# Patient Record
Sex: FEMALE | Race: OTHER | HISPANIC OR LATINO
[De-identification: names, ages, dates, MRNs, and addresses within clinical notes are randomized per-mention and may not be internally consistent; named-entity substitution may affect disease eponyms.]

---

## 2017-01-04 ENCOUNTER — APPOINTMENT (OUTPATIENT)
Dept: ORTHOPEDIC SURGERY | Facility: CLINIC | Age: 77
End: 2017-01-04

## 2017-01-04 VITALS
HEIGHT: 55 IN | SYSTOLIC BLOOD PRESSURE: 159 MMHG | HEART RATE: 69 BPM | BODY MASS INDEX: 32.63 KG/M2 | WEIGHT: 141 LBS | DIASTOLIC BLOOD PRESSURE: 76 MMHG

## 2017-02-06 ENCOUNTER — APPOINTMENT (OUTPATIENT)
Dept: ORTHOPEDIC SURGERY | Facility: CLINIC | Age: 77
End: 2017-02-06

## 2017-02-06 VITALS — SYSTOLIC BLOOD PRESSURE: 144 MMHG | HEART RATE: 82 BPM | DIASTOLIC BLOOD PRESSURE: 83 MMHG

## 2017-02-06 VITALS — BODY MASS INDEX: 27.68 KG/M2 | WEIGHT: 141 LBS | HEIGHT: 60 IN

## 2017-02-06 VITALS — BODY MASS INDEX: 27.48 KG/M2 | WEIGHT: 140 LBS | HEIGHT: 60 IN

## 2017-03-20 ENCOUNTER — APPOINTMENT (OUTPATIENT)
Dept: ORTHOPEDIC SURGERY | Facility: CLINIC | Age: 77
End: 2017-03-20

## 2018-02-27 ENCOUNTER — APPOINTMENT (OUTPATIENT)
Dept: THORACIC SURGERY | Facility: CLINIC | Age: 78
End: 2018-02-27
Payer: MEDICAID

## 2018-02-27 VITALS
HEART RATE: 63 BPM | DIASTOLIC BLOOD PRESSURE: 75 MMHG | BODY MASS INDEX: 28.47 KG/M2 | WEIGHT: 145 LBS | HEIGHT: 60 IN | RESPIRATION RATE: 16 BRPM | SYSTOLIC BLOOD PRESSURE: 167 MMHG | OXYGEN SATURATION: 99 %

## 2018-02-27 DIAGNOSIS — Z80.1 FAMILY HISTORY OF MALIGNANT NEOPLASM OF TRACHEA, BRONCHUS AND LUNG: ICD-10-CM

## 2018-02-27 PROCEDURE — 99203 OFFICE O/P NEW LOW 30 MIN: CPT

## 2018-02-27 RX ORDER — LOSARTAN POTASSIUM 100 MG/1
TABLET, FILM COATED ORAL
Refills: 0 | Status: ACTIVE | COMMUNITY

## 2018-03-01 ENCOUNTER — OUTPATIENT (OUTPATIENT)
Dept: OUTPATIENT SERVICES | Facility: HOSPITAL | Age: 78
LOS: 1 days | End: 2018-03-01
Payer: MEDICAID

## 2018-03-01 DIAGNOSIS — Z98.51 TUBAL LIGATION STATUS: Chronic | ICD-10-CM

## 2018-03-01 PROCEDURE — G9001: CPT

## 2018-03-06 ENCOUNTER — APPOINTMENT (OUTPATIENT)
Dept: ORTHOPEDIC SURGERY | Facility: CLINIC | Age: 78
End: 2018-03-06
Payer: MEDICAID

## 2018-03-06 VITALS — DIASTOLIC BLOOD PRESSURE: 75 MMHG | HEART RATE: 61 BPM | SYSTOLIC BLOOD PRESSURE: 155 MMHG

## 2018-03-06 DIAGNOSIS — G89.29 PAIN IN RIGHT KNEE: ICD-10-CM

## 2018-03-06 DIAGNOSIS — M25.561 PAIN IN RIGHT KNEE: ICD-10-CM

## 2018-03-06 PROCEDURE — 73564 X-RAY EXAM KNEE 4 OR MORE: CPT | Mod: RT

## 2018-03-06 PROCEDURE — 99213 OFFICE O/P EST LOW 20 MIN: CPT

## 2018-03-07 PROBLEM — M25.561 CHRONIC PAIN OF RIGHT KNEE: Status: ACTIVE | Noted: 2018-03-07

## 2018-03-09 ENCOUNTER — APPOINTMENT (OUTPATIENT)
Dept: ORTHOPEDIC SURGERY | Facility: CLINIC | Age: 78
End: 2018-03-09
Payer: MEDICAID

## 2018-03-09 VITALS
DIASTOLIC BLOOD PRESSURE: 84 MMHG | HEIGHT: 60 IN | HEART RATE: 74 BPM | WEIGHT: 145 LBS | SYSTOLIC BLOOD PRESSURE: 169 MMHG | BODY MASS INDEX: 28.47 KG/M2

## 2018-03-09 PROCEDURE — 99215 OFFICE O/P EST HI 40 MIN: CPT

## 2018-03-12 ENCOUNTER — APPOINTMENT (OUTPATIENT)
Dept: ORTHOPEDIC SURGERY | Facility: CLINIC | Age: 78
End: 2018-03-12

## 2018-03-13 ENCOUNTER — APPOINTMENT (OUTPATIENT)
Dept: THORACIC SURGERY | Facility: CLINIC | Age: 78
End: 2018-03-13
Payer: MEDICAID

## 2018-03-13 VITALS
SYSTOLIC BLOOD PRESSURE: 164 MMHG | DIASTOLIC BLOOD PRESSURE: 82 MMHG | HEART RATE: 96 BPM | RESPIRATION RATE: 14 BRPM | OXYGEN SATURATION: 100 %

## 2018-03-13 PROCEDURE — 99213 OFFICE O/P EST LOW 20 MIN: CPT

## 2018-03-14 ENCOUNTER — INPATIENT (INPATIENT)
Facility: HOSPITAL | Age: 78
LOS: 3 days | Discharge: HOME | End: 2018-03-18
Attending: INTERNAL MEDICINE | Admitting: INTERNAL MEDICINE
Payer: MEDICAID

## 2018-03-14 VITALS
HEART RATE: 49 BPM | TEMPERATURE: 98 F | SYSTOLIC BLOOD PRESSURE: 160 MMHG | OXYGEN SATURATION: 100 % | DIASTOLIC BLOOD PRESSURE: 66 MMHG | RESPIRATION RATE: 16 BRPM

## 2018-03-14 DIAGNOSIS — Z98.51 TUBAL LIGATION STATUS: Chronic | ICD-10-CM

## 2018-03-14 LAB
ALBUMIN SERPL ELPH-MCNC: 4.1 G/DL — SIGNIFICANT CHANGE UP (ref 3.3–5)
ALP SERPL-CCNC: 65 U/L — SIGNIFICANT CHANGE UP (ref 40–120)
ALT FLD-CCNC: 23 U/L — SIGNIFICANT CHANGE UP (ref 4–33)
AST SERPL-CCNC: 44 U/L — HIGH (ref 4–32)
BASE EXCESS BLDV CALC-SCNC: 4 MMOL/L — SIGNIFICANT CHANGE UP
BASOPHILS # BLD AUTO: 0.02 K/UL — SIGNIFICANT CHANGE UP (ref 0–0.2)
BASOPHILS NFR BLD AUTO: 0.4 % — SIGNIFICANT CHANGE UP (ref 0–2)
BILIRUB SERPL-MCNC: 0.4 MG/DL — SIGNIFICANT CHANGE UP (ref 0.2–1.2)
BLOOD GAS VENOUS - CREATININE: 0.87 MG/DL — SIGNIFICANT CHANGE UP (ref 0.5–1.3)
BUN SERPL-MCNC: 20 MG/DL — SIGNIFICANT CHANGE UP (ref 7–23)
CALCIUM SERPL-MCNC: 9.3 MG/DL — SIGNIFICANT CHANGE UP (ref 8.4–10.5)
CHLORIDE BLDV-SCNC: 101 MMOL/L — SIGNIFICANT CHANGE UP (ref 96–108)
CHLORIDE SERPL-SCNC: 100 MMOL/L — SIGNIFICANT CHANGE UP (ref 98–107)
CK MB BLD-MCNC: 1.77 NG/ML — SIGNIFICANT CHANGE UP (ref 1–4.7)
CK SERPL-CCNC: 146 U/L — SIGNIFICANT CHANGE UP (ref 25–170)
CO2 SERPL-SCNC: 27 MMOL/L — SIGNIFICANT CHANGE UP (ref 22–31)
CREAT SERPL-MCNC: 1.05 MG/DL — SIGNIFICANT CHANGE UP (ref 0.5–1.3)
EOSINOPHIL # BLD AUTO: 0.04 K/UL — SIGNIFICANT CHANGE UP (ref 0–0.5)
EOSINOPHIL NFR BLD AUTO: 0.8 % — SIGNIFICANT CHANGE UP (ref 0–6)
GAS PNL BLDV: 136 MMOL/L — SIGNIFICANT CHANGE UP (ref 136–146)
GLUCOSE BLDV-MCNC: 122 — HIGH (ref 70–99)
GLUCOSE SERPL-MCNC: 143 MG/DL — HIGH (ref 70–99)
HCO3 BLDV-SCNC: 26 MMOL/L — SIGNIFICANT CHANGE UP (ref 20–27)
HCT VFR BLD CALC: 40.8 % — SIGNIFICANT CHANGE UP (ref 34.5–45)
HCT VFR BLDV CALC: 38.5 % — SIGNIFICANT CHANGE UP (ref 34.5–45)
HGB BLD-MCNC: 12.9 G/DL — SIGNIFICANT CHANGE UP (ref 11.5–15.5)
HGB BLDV-MCNC: 12.5 G/DL — SIGNIFICANT CHANGE UP (ref 11.5–15.5)
IMM GRANULOCYTES # BLD AUTO: 0.02 # — SIGNIFICANT CHANGE UP
IMM GRANULOCYTES NFR BLD AUTO: 0.4 % — SIGNIFICANT CHANGE UP (ref 0–1.5)
LACTATE BLDV-MCNC: 2.9 MMOL/L — HIGH (ref 0.5–2)
LYMPHOCYTES # BLD AUTO: 1.41 K/UL — SIGNIFICANT CHANGE UP (ref 1–3.3)
LYMPHOCYTES # BLD AUTO: 27.5 % — SIGNIFICANT CHANGE UP (ref 13–44)
MCHC RBC-ENTMCNC: 29.3 PG — SIGNIFICANT CHANGE UP (ref 27–34)
MCHC RBC-ENTMCNC: 31.6 % — LOW (ref 32–36)
MCV RBC AUTO: 92.5 FL — SIGNIFICANT CHANGE UP (ref 80–100)
MONOCYTES # BLD AUTO: 0.32 K/UL — SIGNIFICANT CHANGE UP (ref 0–0.9)
MONOCYTES NFR BLD AUTO: 6.3 % — SIGNIFICANT CHANGE UP (ref 2–14)
NEUTROPHILS # BLD AUTO: 3.31 K/UL — SIGNIFICANT CHANGE UP (ref 1.8–7.4)
NEUTROPHILS NFR BLD AUTO: 64.6 % — SIGNIFICANT CHANGE UP (ref 43–77)
NRBC # FLD: 0 — SIGNIFICANT CHANGE UP
PCO2 BLDV: 62 MMHG — HIGH (ref 41–51)
PH BLDV: 7.31 PH — LOW (ref 7.32–7.43)
PLATELET # BLD AUTO: 200 K/UL — SIGNIFICANT CHANGE UP (ref 150–400)
PMV BLD: 11.2 FL — SIGNIFICANT CHANGE UP (ref 7–13)
PO2 BLDV: 26 MMHG — LOW (ref 35–40)
POTASSIUM BLDV-SCNC: 5.8 MMOL/L — HIGH (ref 3.4–4.5)
POTASSIUM SERPL-MCNC: 5.5 MMOL/L — HIGH (ref 3.5–5.3)
POTASSIUM SERPL-SCNC: 5.5 MMOL/L — HIGH (ref 3.5–5.3)
PROT SERPL-MCNC: 8.1 G/DL — SIGNIFICANT CHANGE UP (ref 6–8.3)
RBC # BLD: 4.41 M/UL — SIGNIFICANT CHANGE UP (ref 3.8–5.2)
RBC # FLD: 13 % — SIGNIFICANT CHANGE UP (ref 10.3–14.5)
SAO2 % BLDV: 40.7 % — LOW (ref 60–85)
SODIUM SERPL-SCNC: 138 MMOL/L — SIGNIFICANT CHANGE UP (ref 135–145)
TROPONIN T SERPL-MCNC: < 0.06 NG/ML — SIGNIFICANT CHANGE UP (ref 0–0.06)
WBC # BLD: 5.12 K/UL — SIGNIFICANT CHANGE UP (ref 3.8–10.5)
WBC # FLD AUTO: 5.12 K/UL — SIGNIFICANT CHANGE UP (ref 3.8–10.5)

## 2018-03-14 PROCEDURE — 71045 X-RAY EXAM CHEST 1 VIEW: CPT | Mod: 26

## 2018-03-14 PROCEDURE — 74174 CTA ABD&PLVS W/CONTRAST: CPT | Mod: 26

## 2018-03-14 PROCEDURE — 70450 CT HEAD/BRAIN W/O DYE: CPT | Mod: 26

## 2018-03-14 PROCEDURE — 71275 CT ANGIOGRAPHY CHEST: CPT | Mod: 26

## 2018-03-14 NOTE — ED PROVIDER NOTE - PSYCHIATRIC, MLM
Alert and oriented to person, place, time/situation. normal mood and affect. no apparent risk to self or others. None

## 2018-03-14 NOTE — ED PROVIDER NOTE - PROGRESS NOTE DETAILS
Stu Arambula MD PGY4: Labs, imaging reviewed. Case d/w WESTLEY Lovell, accepted for Dr. Lopez. Telemetry PA signout @ [id number].

## 2018-03-14 NOTE — ED ADULT TRIAGE NOTE - CHIEF COMPLAINT QUOTE
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 Hx report by her daughter and grandson.  FS 128mg/dl.

## 2018-03-14 NOTE — ED ADULT NURSE NOTE - OBJECTIVE STATEMENT
Pt received to  8 a/0x3 and ambulatory at baseline c/o syncope. Pt primarily Latvian speaking, family at bedside to translate. Pt states she passed out at 9am x 2 episodes this morning. Pt placed on monitor, irregular hr noted. 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 Hx report by her daughter and grandson.  FS 128mg/dl. US IV in progress. Will continue to monitor.

## 2018-03-14 NOTE — ED PROVIDER NOTE - ATTENDING CONTRIBUTION TO CARE
[facility] Attending Note - Dr. Cheek 77 Y F with pmhx HTN who presents after 2 episodes of unwitnessed syncope today   PE: pt is alert and oriented, perrl, ent normal, membranes are moist, neck supple. no lymphadenopathy or thyroid enlargement, No JVD.  Chest clear to P&A, Heart- reg rhythm without murmur, rubs or gallops, radial pulses equal bilaterally.  Abd is soft, non-tender, Bowel sounds are active. no mass or organomegaly. : No CVA tenderness. Neuro:  Pt alert and oriented x 3. Perrl    Distal neurosensory is intact. Motor function is 5/5 strength bilaterally.  No focal deficits. Extremities:  No edema.  Skin: warm and dry.  Plan:  Labs, including CBC, CMP ,U/A, EKG to R/O cardiac etiology and cardiac enzymes, CT scan of head to R/O head injury..     Impression:

## 2018-03-14 NOTE — ED PROVIDER NOTE - MEDICAL DECISION MAKING DETAILS
77 Y F with HTN and 2 episodes of syncope today in the setting of hip and back pain. DDx: ACS, Arrythmia, disection, vaso vagal. Will get lab work as well as CTA abdomen chest, likely dispo admission given abnormal ekg.

## 2018-03-15 DIAGNOSIS — R91.1 SOLITARY PULMONARY NODULE: ICD-10-CM

## 2018-03-15 DIAGNOSIS — R55 SYNCOPE AND COLLAPSE: ICD-10-CM

## 2018-03-15 DIAGNOSIS — M54.5 LOW BACK PAIN: ICD-10-CM

## 2018-03-15 DIAGNOSIS — Z29.9 ENCOUNTER FOR PROPHYLACTIC MEASURES, UNSPECIFIED: ICD-10-CM

## 2018-03-15 DIAGNOSIS — I10 ESSENTIAL (PRIMARY) HYPERTENSION: ICD-10-CM

## 2018-03-15 DIAGNOSIS — Z85.831 PERSONAL HISTORY OF MALIGNANT NEOPLASM OF SOFT TISSUE: Chronic | ICD-10-CM

## 2018-03-15 LAB
BUN SERPL-MCNC: 17 MG/DL — SIGNIFICANT CHANGE UP (ref 7–23)
CALCIUM SERPL-MCNC: 9.1 MG/DL — SIGNIFICANT CHANGE UP (ref 8.4–10.5)
CHLORIDE SERPL-SCNC: 102 MMOL/L — SIGNIFICANT CHANGE UP (ref 98–107)
CHOLEST SERPL-MCNC: 287 MG/DL — HIGH (ref 120–199)
CK MB BLD-MCNC: 1.1 NG/ML — SIGNIFICANT CHANGE UP (ref 1–4.7)
CK MB BLD-MCNC: 1.15 NG/ML — SIGNIFICANT CHANGE UP (ref 1–4.7)
CK MB BLD-MCNC: SIGNIFICANT CHANGE UP (ref 0–2.5)
CK SERPL-CCNC: 78 U/L — SIGNIFICANT CHANGE UP (ref 25–170)
CK SERPL-CCNC: 86 U/L — SIGNIFICANT CHANGE UP (ref 25–170)
CO2 SERPL-SCNC: 27 MMOL/L — SIGNIFICANT CHANGE UP (ref 22–31)
CREAT SERPL-MCNC: 0.94 MG/DL — SIGNIFICANT CHANGE UP (ref 0.5–1.3)
GLUCOSE SERPL-MCNC: 89 MG/DL — SIGNIFICANT CHANGE UP (ref 70–99)
HDLC SERPL-MCNC: 89 MG/DL — HIGH (ref 45–65)
LACTATE SERPL-SCNC: 0.9 MMOL/L — SIGNIFICANT CHANGE UP (ref 0.5–2)
LIPID PNL WITH DIRECT LDL SERPL: 198 MG/DL — SIGNIFICANT CHANGE UP
MAGNESIUM SERPL-MCNC: 2 MG/DL — SIGNIFICANT CHANGE UP (ref 1.6–2.6)
PHOSPHATE SERPL-MCNC: 3.4 MG/DL — SIGNIFICANT CHANGE UP (ref 2.5–4.5)
POTASSIUM SERPL-MCNC: 4.7 MMOL/L — SIGNIFICANT CHANGE UP (ref 3.5–5.3)
POTASSIUM SERPL-SCNC: 4.7 MMOL/L — SIGNIFICANT CHANGE UP (ref 3.5–5.3)
SODIUM SERPL-SCNC: 139 MMOL/L — SIGNIFICANT CHANGE UP (ref 135–145)
TRIGL SERPL-MCNC: 79 MG/DL — SIGNIFICANT CHANGE UP (ref 10–149)
TROPONIN T SERPL-MCNC: < 0.06 NG/ML — SIGNIFICANT CHANGE UP (ref 0–0.06)
TROPONIN T SERPL-MCNC: < 0.06 NG/ML — SIGNIFICANT CHANGE UP (ref 0–0.06)
TSH SERPL-MCNC: 2.14 UIU/ML — SIGNIFICANT CHANGE UP (ref 0.27–4.2)

## 2018-03-15 PROCEDURE — 72110 X-RAY EXAM L-2 SPINE 4/>VWS: CPT | Mod: 26

## 2018-03-15 PROCEDURE — 73502 X-RAY EXAM HIP UNI 2-3 VIEWS: CPT | Mod: 26,LT

## 2018-03-15 RX ORDER — LOSARTAN POTASSIUM 100 MG/1
1 TABLET, FILM COATED ORAL
Qty: 0 | Refills: 0 | COMMUNITY

## 2018-03-15 RX ORDER — INFLUENZA VIRUS VACCINE 15; 15; 15; 15 UG/.5ML; UG/.5ML; UG/.5ML; UG/.5ML
0.5 SUSPENSION INTRAMUSCULAR ONCE
Qty: 0 | Refills: 0 | Status: DISCONTINUED | OUTPATIENT
Start: 2018-03-15 | End: 2018-03-18

## 2018-03-15 RX ORDER — LOSARTAN POTASSIUM 100 MG/1
50 TABLET, FILM COATED ORAL DAILY
Qty: 0 | Refills: 0 | Status: DISCONTINUED | OUTPATIENT
Start: 2018-03-15 | End: 2018-03-18

## 2018-03-15 RX ORDER — LOSARTAN POTASSIUM 100 MG/1
50 TABLET, FILM COATED ORAL DAILY
Qty: 0 | Refills: 0 | Status: DISCONTINUED | OUTPATIENT
Start: 2018-03-15 | End: 2018-03-15

## 2018-03-15 RX ORDER — ENOXAPARIN SODIUM 100 MG/ML
40 INJECTION SUBCUTANEOUS DAILY
Qty: 0 | Refills: 0 | Status: DISCONTINUED | OUTPATIENT
Start: 2018-03-15 | End: 2018-03-18

## 2018-03-15 RX ORDER — ACETAMINOPHEN 500 MG
650 TABLET ORAL EVERY 6 HOURS
Qty: 0 | Refills: 0 | Status: DISCONTINUED | OUTPATIENT
Start: 2018-03-15 | End: 2018-03-18

## 2018-03-15 RX ADMIN — Medication 650 MILLIGRAM(S): at 19:14

## 2018-03-15 RX ADMIN — LOSARTAN POTASSIUM 50 MILLIGRAM(S): 100 TABLET, FILM COATED ORAL at 19:14

## 2018-03-15 RX ADMIN — Medication 650 MILLIGRAM(S): at 19:44

## 2018-03-15 RX ADMIN — ENOXAPARIN SODIUM 40 MILLIGRAM(S): 100 INJECTION SUBCUTANEOUS at 13:48

## 2018-03-15 NOTE — H&P ADULT - ASSESSMENT
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 EKG: Sinus Tachycardia w/ TWI in V1-V2. CT Head: Negative for acute hemorrhage, CT Angio Chest- Positive for 5 mm nodule in fissure, negative for aortic dissection. Admit to telemetry for syncope. 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 EKG: Sinus Tachycardia w/ TWI in V1-V2. CT Head: Negative for acute hemorrhage, CT Angio Chest- Positive for 5 mm nodule in fissure, negative for aortic dissection. Admit to telemetry for syncope w/u.

## 2018-03-15 NOTE — H&P ADULT - NEGATIVE MUSCULOSKELETAL SYMPTOMS
no arm pain R/no leg pain R/no leg pain L/no joint swelling/no myalgia/no arm pain L/no arthralgia/no muscle cramps/no muscle weakness/no neck pain

## 2018-03-15 NOTE — CONSULT NOTE ADULT - ATTENDING COMMENTS
discussed with patient in detail, all questions answered  discussed with family at bedside in detail
Pain related with near syncopal events. I suspect vagal response to pain. No events recorded here on telemetry. Only sinus with APC's. Will continue to monitor.

## 2018-03-15 NOTE — CONSULT NOTE ADULT - PROBLEM SELECTOR RECOMMENDATION 9
likely musculoskeletal   no neurology deficit  will monitor  may image lumbar spine if no improvement

## 2018-03-15 NOTE — H&P ADULT - PROBLEM SELECTOR PLAN 1
Admit to Telemetry  Serial EKG's  CE x 2 (1st set were negative)  Orthostatic BP's  Consider Echocardiogram for further cardiac w/u Admit to Telemetry, Serial EKG's, CE's, FLP, check Echo, Orthostatic BP's. F/U Cardiology note

## 2018-03-15 NOTE — H&P ADULT - MUSCULOSKELETAL
details… detailed exam ROM intact/no joint swelling/no joint erythema/no joint warmth/no calf tenderness

## 2018-03-15 NOTE — H&P ADULT - PSH
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

## 2018-03-15 NOTE — H&P ADULT - ATTENDING COMMENTS
Patient seen and examined.  Agree with above.   -Admitted with flank pain, back pain and syncope  -Dissection ruled out with ct  -check TTE  -Check UA  -monitor on tele    Vanessa De Leon MD  [facility]  [address] [place] [address], [id number]  [place] [address]  office: [phone]  pager: [phone]

## 2018-03-15 NOTE — H&P ADULT - RS GEN PE MLT RESP DETAILS PC
no chest wall tenderness/no wheezes/good air movement/normal/airway patent/no rhonchi/no rales/respirations non-labored/breath sounds equal/clear to auscultation bilaterally

## 2018-03-15 NOTE — CONSULT NOTE ADULT - ASSESSMENT
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 Patient had episode of what was read as bradycardia in ED. Patient with sinus rhythm with APCs on tele. Was asymptomatic except for back pain during that time. CE negative, no ischemic changes on ECG. Likely vagal response caused syncope.    -monitor on tele  -will continue to reassess
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 EKG: Sinus Tachycardia w/ TWI in V1-V2. CT Head: Negative for acute hemorrhage, CT Angio Chest- Positive for 5 mm nodule in fissure, negative for aortic dissection. Admit to telemetry for syncope w/u.

## 2018-03-15 NOTE — H&P ADULT - NEGATIVE NEUROLOGICAL SYMPTOMS
no syncope/no headache/no paresthesias/no focal seizures/no loss of consciousness/no loss of sensation/no weakness/no generalized seizures/no confusion

## 2018-03-15 NOTE — CONSULT NOTE ADULT - SUBJECTIVE AND OBJECTIVE BOX
Patient seen and evaluated at bedside    Chief Complaint:    HPI:  78 y/o F w/ PMHx of HTN h/o sarcoma of R leg, s/p resection in 2017, p/w 2 episodes of syncope yesterday morning. (Daughter was present to translate). As per daughter, yesterday at 9am, pt experienced severe lower back and left flank pain, 10/10, while sitting in her kitchen. She got up to get some water, and when she sat back down she felt lightheaded and slumped from the chair to the floor. She remembers sliding from the chair to the floor, and was observed falling by her other daughter. States she got up from the floor w/in 30 seconds and began to feel lightheaded again and fell, from standing, back to the floor (witnessed). After a minute, she got up again, ate some breakfast and went to sleep for a few hours. Upon awaking, she was still in severe pain and was brought in to the hospital by her daughter. Pt states that she has had long standing lower back pain for multiple years, but it has never been to this severity. Pt denies any recent trauma, strenuous activity, CP, diaphoresis, SOB, FERANNDO, fever, cough, chills, N/V/D, constipation, frequency, urgency, dysuria, abdominal pain. No Urinary/ fecal incontinence or retention, or saddle anesthesia. Currently, Pt is sitting comfortably in bed, states that her pain has decreased significantly (2/10), but hurts when she touches her back or when she shifts in bed. (15 Mar 2018 08:05)      PMH:   Sarcoma  HTN (hypertension)  No pertinent past medical history      PSH:   H/O sarcoma of soft tissue  History of tubal ligation      Medications:   enoxaparin Injectable 40 milliGRAM(s) SubCutaneous daily  losartan 50 milliGRAM(s) Oral daily      Allergies:  No Known Allergies      FAMILY HISTORY:  Family history of lung cancer (Mother): Patient&#x27;s mother had lung cancer      Social History:  Smoking History:  Alcohol Use:  Drug Use:    Review of Systems:  REVIEW OF SYSTEMS:    CONSTITUTIONAL: No weakness, fevers or chills  EYES/ENT: No visual changes;  No dysphagia  NECK: No pain or stiffness  RESPIRATORY: No cough, wheezing, hemoptysis; No shortness of breath  CARDIOVASCULAR: No chest pain or palpitations; No lower extremity edema  GASTROINTESTINAL: No abdominal or epigastric pain. No nausea, vomiting, or hematemesis; No diarrhea or constipation. No melena or hematochezia.  BACK: No back pain  GENITOURINARY: No dysuria, frequency or hematuria  NEUROLOGICAL: No numbness or weakness  SKIN: No itching, burning, rashes, or lesions   All other review of systems is negative unless indicated above.    Physical Exam:  T(F): 97.6 (-15), Max: 97.8 (03-15)  HR: 107 (03-15) (77 - 107)  BP: 132/55 (03-15) (122/52 - 164/68)  RR: 16 (-15)  SpO2: 95% (03-15)  GENERAL: No acute distress, well-developed  HEAD:  Atraumatic, Normocephalic  ENT: EOMI, PERRLA, conjunctiva and sclera clear, Neck supple, No JVD, moist mucosa  CHEST/LUNG: Clear to auscultation bilaterally; No wheeze, equal breath sounds bilaterally   BACK: No spinal tenderness  HEART: Regular rate and rhythm; No murmurs, rubs, or gallops  ABDOMEN: Soft, Nontender, Nondistended; Bowel sounds present  EXTREMITIES:  No clubbing, cyanosis, or edema  PSYCH: Nl behavior, nl affect  NEUROLOGY: AAOx3, non-focal, cranial nerves intact  SKIN: Normal color, No rashes or lesions  LINES:    Cardiovascular Diagnostic Testing:    ECG: Personally reviewed    Echo:    Stress Testing:    Cath:    Interpretation of Telemetry:    Imaging:    Labs: Personally reviewed                        12.9   5.12  )-----------( 200      ( 14 Mar 2018 18:02 )             40.8     03-15    139  |  102  |  17  ----------------------------<  89  4.7   |  27  |  0.94    Ca    9.1      15 Mar 2018 10:09  Phos  3.4     -15  Mg     2.0     -15    TPro  8.1  /  Alb  4.1  /  TBili  0.4  /  DBili  x   /  AST  44<H>  /  ALT  23  /  AlkPhos  65  03-14      CARDIAC MARKERS ( 15 Mar 2018 10:09 )  x     / < 0.06 ng/mL / 86 u/L / 1.15 ng/mL / x      CARDIAC MARKERS ( 15 Mar 2018 05:00 )  x     / < 0.06 ng/mL / 78 u/L / 1.10 ng/mL / x      CARDIAC MARKERS ( 14 Mar 2018 18:02 )  x     / < 0.06 ng/mL / 146 u/L / 1.77 ng/mL / x            Total Cholesterol: 287  LDL: 198  HDL: 89  T      Thyroid Stimulating Hormone, Serum: 2.14 uIU/mL (03-15 @ 10:09) Patient seen and evaluated at bedside    Chief Complaint:    HPI:  76 y/o F w/ PMHx of HTN h/o sarcoma of R leg, s/p resection in 2017, p/w 2 episodes of syncope yesterday morning. (Daughter was present to translate). As per daughter, yesterday at 9am, pt experienced severe lower back and left flank pain, 10/10, while sitting in her kitchen. She got up to get some water, and when she sat back down she felt lightheaded and slumped from the chair to the floor. She remembers sliding from the chair to the floor, and was observed falling by her other daughter. States she got up from the floor w/in 30 seconds and began to feel lightheaded again and fell, from standing, back to the floor (witnessed). After a minute, she got up again, ate some breakfast and went to sleep for a few hours. Upon awaking, she was still in severe pain and was brought in to the hospital by her daughter. Pt states that she has had long standing lower back pain for multiple years, but it has never been to this severity. Pt denies any recent trauma, strenuous activity, CP, diaphoresis, SOB, FERNANDO, fever, cough, chills, N/V/D, constipation, frequency, urgency, dysuria, abdominal pain. No Urinary/ fecal incontinence or retention, or saddle anesthesia. Currently, Pt is sitting comfortably in bed, states that her pain has decreased significantly (2/10), but hurts when she touches her back or when she shifts in bed. (15 Mar 2018 08:05)    Patient had episode of what was read as bradycardia in ED. Patient with sinus rhythm with APCs on tele. Was asymptomatic except for back pain during that time. CE negative, no ischemic changes on ECG.    PMH:   Sarcoma  HTN (hypertension)  No pertinent past medical history      PSH:   H/O sarcoma of soft tissue  History of tubal ligation      Medications:   enoxaparin Injectable 40 milliGRAM(s) SubCutaneous daily  losartan 50 milliGRAM(s) Oral daily      Allergies:  No Known Allergies      FAMILY HISTORY:  Family history of lung cancer (Mother): Patient&#x27;s mother had lung cancer      Social History:  Smoking History:  Alcohol Use:  Drug Use:    Review of Systems:  REVIEW OF SYSTEMS:    CONSTITUTIONAL: +weakness, fevers or chills  EYES/ENT: No visual changes;  No dysphagia  NECK: No pain or stiffness  RESPIRATORY: No cough, wheezing, hemoptysis; No shortness of breath  CARDIOVASCULAR: No chest pain or palpitations; No lower extremity edema  GASTROINTESTINAL: No abdominal or epigastric pain. No nausea, vomiting, or hematemesis; No diarrhea or constipation. No melena or hematochezia.  BACK: +back pain  GENITOURINARY: No dysuria, frequency or hematuria  NEUROLOGICAL: No numbness or weakness  SKIN: No itching, burning, rashes, or lesions   All other review of systems is negative unless indicated above.    Physical Exam:  T(F): 97.6 (03-15), Max: 97.8 (03-15)  HR: 107 (03-15) (77 - 107)  BP: 132/55 (03-15) (122/52 - 164/68)  RR: 16 (-15)  SpO2: 95% (03-15)  GENERAL: No acute distress, well-developed  HEAD:  Atraumatic, Normocephalic  ENT: EOMI, PERRLA, conjunctiva and sclera clear, Neck supple, No JVD, moist mucosa  CHEST/LUNG: Clear to auscultation bilaterally; No wheeze, equal breath sounds bilaterally   BACK: No spinal tenderness  HEART: Regular rate and rhythm; No murmurs, rubs, or gallops  ABDOMEN: Soft, Nontender, Nondistended; Bowel sounds present  EXTREMITIES:  No clubbing, cyanosis, or edema  PSYCH: Nl behavior, nl affect  NEUROLOGY: AAOx3, non-focal, cranial nerves intact      Cardiovascular Diagnostic Testing:    ECG: NSR with APCs, no ST changes    CT C/A/P  IMPRESSION:     No dissection of the aorta.      Labs: Personally reviewed                        12.9   5.12  )-----------( 200      ( 14 Mar 2018 18:02 )             40.8     03-15    139  |  102  |  17  ----------------------------<  89  4.7   |  27  |  0.94    Ca    9.1      15 Mar 2018 10:09  Phos  3.4     03-15  Mg     2.0     -15    TPro  8.1  /  Alb  4.1  /  TBili  0.4  /  DBili  x   /  AST  44<H>  /  ALT  23  /  AlkPhos  65  03-14      CARDIAC MARKERS ( 15 Mar 2018 10:09 )  x     / < 0.06 ng/mL / 86 u/L / 1.15 ng/mL / x      CARDIAC MARKERS ( 15 Mar 2018 05:00 )  x     / < 0.06 ng/mL / 78 u/L / 1.10 ng/mL / x      CARDIAC MARKERS ( 14 Mar 2018 18:02 )  x     / < 0.06 ng/mL / 146 u/L / 1.77 ng/mL / x            Total Cholesterol: 287  LDL: 198  HDL: 89  T      Thyroid Stimulating Hormone, Serum: 2.14 uIU/mL (03-15 @ 10:09)

## 2018-03-15 NOTE — H&P ADULT - NSHPSOCIALHISTORY_GEN_ALL_CORE
This is a 78 y/o F who lives with her daughter  Denies home O2 and ambulates independently  Denies Tobacco, Alcohol, or Drugs This is a 76 y/o F who lives with her daughter  Denies home O2 use and ambulates independently  Denies Tobacco, Alcohol, or Drugs

## 2018-03-15 NOTE — H&P ADULT - NEGATIVE CARDIOVASCULAR SYMPTOMS
no orthopnea/no paroxysmal nocturnal dyspnea/no dyspnea on exertion/no palpitations/no claudication/no peripheral edema/no chest pain

## 2018-03-15 NOTE — CONSULT NOTE ADULT - SUBJECTIVE AND OBJECTIVE BOX
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 (Daughter was present to translate). As per daughter, yesterday at 9am, pt experienced severe lower back and left flank pain, 10/10, while sitting in her kitchen. She got up to get some water, and when she sat back down she felt lightheaded and slumped from the chair to the floor. She remembers sliding from the chair to the floor, and was observed falling by her other daughter. States she got up from the floor w/in 30 seconds and began to feel lightheaded again and fell, from standing, back to the floor (witnessed). After a minute, she got up again, ate some breakfast and went to sleep for a few hours. Upon awaking, she was still in severe pain and was brought in to the hospital by her daughter. Pt states that she has had long standing lower back pain for multiple years, but it has never been to this severity. Pt denies any recent trauma, strenuous activity, CP, diaphoresis, SOB, FERNANDO, fever, cough, chills, N/V/D, constipation, frequency, urgency, dysuria, abdominal pain. No Urinary/ fecal incontinence or retention, or saddle anesthesia. 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 No nausea, vomiting, or hematemesis; No diarrhea or constipation. No melena or hematochezia.  GENITOURINARY: No dysuria, frequency, hematuria, or incontinence  NEUROLOGICAL: No headaches, memory loss, loss of strength, numbness, or tremors  SKIN: No itching, burning, rashes, or lesions   LYMPH NODES: No enlarged glands  ENDOCRINE: No heat or cold intolerance; No hair loss  MUSCULOSKELETAL: No joint pain or swelling; No muscle, back, or extremity pain  PSYCHIATRIC: No depression, anxiety, mood swings, or difficulty sleeping  HEME/LYMPH: No easy bruising, or bleeding gums  ALLERGY AND IMMUNOLOGIC: No hives or eczema    Allergies    No Known Allergies    Social History: non smoker  no ETOH  no IVDA  lives with family    FAMILY HISTORY:  Family history of lung cancer (Mother): Patient&#x27;s mother had lung cancer      MEDICATIONS  (STANDING):  enoxaparin Injectable 40 milliGRAM(s) SubCutaneous daily      POCT Blood Glucose.: 128 mg/dL (14 Mar 2018 16:37)    I&O's Summary      PHYSICAL EXAM:  GENERAL: NAD, well-developed  HEAD:  Atraumatic, Normocephalic  EYES: EOMI, PERRLA, conjunctiva and sclera clear  NECK: Supple, No JVD  CHEST/LUNG: Clear to auscultation bilaterally; No wheeze  HEART: S1S2; soft ejection systolic murmur best heard at left sternal border   ABDOMEN: Soft, Nontender, Nondistended; Bowel sounds present  EXTREMITIES:  + Peripheral Pulses, No clubbing or cyanosis, no edema  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  PSYCH: AO x 3, pleasant  NEUROLOGY: Alert, no focal motor or sensory deficits  SKIN: No rashes or lesions    LABS:                        12.9   5.12  )-----------( 200      ( 14 Mar 2018 18:02 )             40.8     03-15    139  |  102  |  17  ----------------------------<  89  4.7   |  27  |  0.94    Ca    9.1      15 Mar 2018 10:09  Phos  3.4     03-15  Mg     2.0     03-15    TPro  8.1  /  Alb  4.1  /  TBili  0.4  /  DBili  x   /  AST  44<H>  /  ALT  23  /  AlkPhos  65  03-14      CARDIAC MARKERS ( 15 Mar 2018 10:09 )  x     / < 0.06 ng/mL / 86 u/L / 1.15 ng/mL / x      CARDIAC MARKERS ( 15 Mar 2018 05:00 )  x     / < 0.06 ng/mL / 78 u/L / 1.10 ng/mL / x      CARDIAC MARKERS ( 14 Mar 2018 18:02 )  x     / < 0.06 ng/mL / 146 u/L / 1.77 ng/mL / x              RADIOLOGY & ADDITIONAL TESTS:    Consultant(s) Notes Reviewed:      Care Discussed with Consultants/Other Providers:

## 2018-03-15 NOTE — H&P ADULT - GASTROINTESTINAL DETAILS
no distention/nontender/no rigidity/no rebound tenderness/no organomegaly/normal/soft/bowel sounds normal/no bruit/no guarding/no masses palpable

## 2018-03-15 NOTE — H&P ADULT - HISTORY OF PRESENT ILLNESS
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 (Daughter was present to translate but was unable to clearly relay information from the pt). As per daughter, yesterday at 9am, pt experienced severe  lower back and left flank pain, 10/10, while sitting in her kitchen. She got up to get some water, and when she sat back down she felt lightheaded and slumped from the chair to the floor. She remembers sliding from the chair to the floor, and was observed falling by her other daughter. States she got up from the floor w/in 30 seconds and began to feel lightheaded again and fell, from standing, back to the floor (witnessed). After a minute, she got up again, ate some breakfast and went to sleep for a few hours. Upon awaking, she was still in severe pain and was brought in to the hospital by her daughter. Pt states that she has had long standing lower back pain for multiple years, but it has never been to this severity. Pt denies any CP, diaphoresis, SOB, FERNANDO, fever, cough, chills, N/V/D, constipation, frequency, urgency, dysuria, abdominal pain.   Currently, Pt is sitting comfortably in bed, states that her pain has decreased significantly (2/10), but hurts when she touches her back or when she shifts in bed. 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 (Daughter was present to translate). As per daughter, yesterday at 9am, pt experienced severe lower back and left flank pain, 10/10, while sitting in her kitchen. She got up to get some water, and when she sat back down she felt lightheaded and slumped from the chair to the floor. She remembers sliding from the chair to the floor, and was observed falling by her other daughter. States she got up from the floor w/in 30 seconds and began to feel lightheaded again and fell, from standing, back to the floor (witnessed). After a minute, she got up again, ate some breakfast and went to sleep for a few hours. Upon awaking, she was still in severe pain and was brought in to the hospital by her daughter. Pt states that she has had long standing lower back pain for multiple years, but it has never been to this severity. Pt denies any recent trauma, strenuous activity, CP, diaphoresis, SOB, FERNANDO, fever, cough, chills, N/V/D, constipation, frequency, urgency, dysuria, abdominal pain. No Urinary/ fecal incontinence or retention, or saddle anesthesia. Currently, Pt is sitting comfortably in bed, states that her pain has decreased significantly (2/10), but hurts when she touches her back or when she shifts in bed.

## 2018-03-16 ENCOUNTER — APPOINTMENT (OUTPATIENT)
Dept: ORTHOPEDIC SURGERY | Facility: CLINIC | Age: 78
End: 2018-03-16

## 2018-03-16 LAB
APPEARANCE UR: CLEAR — SIGNIFICANT CHANGE UP
BILIRUB UR-MCNC: NEGATIVE — SIGNIFICANT CHANGE UP
BLOOD UR QL VISUAL: NEGATIVE — SIGNIFICANT CHANGE UP
BUN SERPL-MCNC: 19 MG/DL — SIGNIFICANT CHANGE UP (ref 7–23)
CALCIUM SERPL-MCNC: 8.9 MG/DL — SIGNIFICANT CHANGE UP (ref 8.4–10.5)
CHLORIDE SERPL-SCNC: 100 MMOL/L — SIGNIFICANT CHANGE UP (ref 98–107)
CO2 SERPL-SCNC: 28 MMOL/L — SIGNIFICANT CHANGE UP (ref 22–31)
COLOR SPEC: SIGNIFICANT CHANGE UP
CREAT SERPL-MCNC: 0.93 MG/DL — SIGNIFICANT CHANGE UP (ref 0.5–1.3)
GLUCOSE SERPL-MCNC: 99 MG/DL — SIGNIFICANT CHANGE UP (ref 70–99)
GLUCOSE UR-MCNC: NEGATIVE — SIGNIFICANT CHANGE UP
HCT VFR BLD CALC: 39 % — SIGNIFICANT CHANGE UP (ref 34.5–45)
HGB BLD-MCNC: 12.9 G/DL — SIGNIFICANT CHANGE UP (ref 11.5–15.5)
KETONES UR-MCNC: NEGATIVE — SIGNIFICANT CHANGE UP
LEUKOCYTE ESTERASE UR-ACNC: HIGH
MAGNESIUM SERPL-MCNC: 2.2 MG/DL — SIGNIFICANT CHANGE UP (ref 1.6–2.6)
MCHC RBC-ENTMCNC: 30.4 PG — SIGNIFICANT CHANGE UP (ref 27–34)
MCHC RBC-ENTMCNC: 33.1 % — SIGNIFICANT CHANGE UP (ref 32–36)
MCV RBC AUTO: 92 FL — SIGNIFICANT CHANGE UP (ref 80–100)
MUCOUS THREADS # UR AUTO: SIGNIFICANT CHANGE UP
NITRITE UR-MCNC: NEGATIVE — SIGNIFICANT CHANGE UP
NRBC # FLD: 0 — SIGNIFICANT CHANGE UP
PH UR: 5.5 — SIGNIFICANT CHANGE UP (ref 4.6–8)
PLATELET # BLD AUTO: 201 K/UL — SIGNIFICANT CHANGE UP (ref 150–400)
PMV BLD: 10.7 FL — SIGNIFICANT CHANGE UP (ref 7–13)
POTASSIUM SERPL-MCNC: 4.2 MMOL/L — SIGNIFICANT CHANGE UP (ref 3.5–5.3)
POTASSIUM SERPL-SCNC: 4.2 MMOL/L — SIGNIFICANT CHANGE UP (ref 3.5–5.3)
PROT UR-MCNC: NEGATIVE MG/DL — SIGNIFICANT CHANGE UP
RBC # BLD: 4.24 M/UL — SIGNIFICANT CHANGE UP (ref 3.8–5.2)
RBC # FLD: 13 % — SIGNIFICANT CHANGE UP (ref 10.3–14.5)
RBC CASTS # UR COMP ASSIST: SIGNIFICANT CHANGE UP (ref 0–?)
SODIUM SERPL-SCNC: 140 MMOL/L — SIGNIFICANT CHANGE UP (ref 135–145)
SP GR SPEC: 1.01 — SIGNIFICANT CHANGE UP (ref 1–1.04)
SQUAMOUS # UR AUTO: SIGNIFICANT CHANGE UP
UROBILINOGEN FLD QL: NORMAL MG/DL — SIGNIFICANT CHANGE UP
WBC # BLD: 5.13 K/UL — SIGNIFICANT CHANGE UP (ref 3.8–10.5)
WBC # FLD AUTO: 5.13 K/UL — SIGNIFICANT CHANGE UP (ref 3.8–10.5)
WBC UR QL: HIGH (ref 0–?)

## 2018-03-16 PROCEDURE — 99233 SBSQ HOSP IP/OBS HIGH 50: CPT

## 2018-03-16 RX ORDER — KETOROLAC TROMETHAMINE 30 MG/ML
15 SYRINGE (ML) INJECTION EVERY 8 HOURS
Qty: 0 | Refills: 0 | Status: DISCONTINUED | OUTPATIENT
Start: 2018-03-16 | End: 2018-03-17

## 2018-03-16 RX ADMIN — Medication 15 MILLIGRAM(S): at 22:30

## 2018-03-16 RX ADMIN — Medication 15 MILLIGRAM(S): at 21:45

## 2018-03-16 RX ADMIN — LOSARTAN POTASSIUM 50 MILLIGRAM(S): 100 TABLET, FILM COATED ORAL at 05:47

## 2018-03-16 RX ADMIN — ENOXAPARIN SODIUM 40 MILLIGRAM(S): 100 INJECTION SUBCUTANEOUS at 11:40

## 2018-03-17 LAB
BUN SERPL-MCNC: 17 MG/DL — SIGNIFICANT CHANGE UP (ref 7–23)
CALCIUM SERPL-MCNC: 8.9 MG/DL — SIGNIFICANT CHANGE UP (ref 8.4–10.5)
CHLORIDE SERPL-SCNC: 100 MMOL/L — SIGNIFICANT CHANGE UP (ref 98–107)
CO2 SERPL-SCNC: 25 MMOL/L — SIGNIFICANT CHANGE UP (ref 22–31)
CREAT SERPL-MCNC: 1 MG/DL — SIGNIFICANT CHANGE UP (ref 0.5–1.3)
ERYTHROCYTE [SEDIMENTATION RATE] IN BLOOD: 13 MM/HR — SIGNIFICANT CHANGE UP (ref 4–25)
GLUCOSE SERPL-MCNC: 80 MG/DL — SIGNIFICANT CHANGE UP (ref 70–99)
HCT VFR BLD CALC: 37 % — SIGNIFICANT CHANGE UP (ref 34.5–45)
HGB BLD-MCNC: 12.1 G/DL — SIGNIFICANT CHANGE UP (ref 11.5–15.5)
MAGNESIUM SERPL-MCNC: 2.1 MG/DL — SIGNIFICANT CHANGE UP (ref 1.6–2.6)
MCHC RBC-ENTMCNC: 29.6 PG — SIGNIFICANT CHANGE UP (ref 27–34)
MCHC RBC-ENTMCNC: 32.7 % — SIGNIFICANT CHANGE UP (ref 32–36)
MCV RBC AUTO: 90.5 FL — SIGNIFICANT CHANGE UP (ref 80–100)
NRBC # FLD: 0 — SIGNIFICANT CHANGE UP
PLATELET # BLD AUTO: 195 K/UL — SIGNIFICANT CHANGE UP (ref 150–400)
PMV BLD: 10.8 FL — SIGNIFICANT CHANGE UP (ref 7–13)
POTASSIUM SERPL-MCNC: 5 MMOL/L — SIGNIFICANT CHANGE UP (ref 3.5–5.3)
POTASSIUM SERPL-SCNC: 5 MMOL/L — SIGNIFICANT CHANGE UP (ref 3.5–5.3)
RBC # BLD: 4.09 M/UL — SIGNIFICANT CHANGE UP (ref 3.8–5.2)
RBC # FLD: 12.9 % — SIGNIFICANT CHANGE UP (ref 10.3–14.5)
SODIUM SERPL-SCNC: 138 MMOL/L — SIGNIFICANT CHANGE UP (ref 135–145)
WBC # BLD: 5.11 K/UL — SIGNIFICANT CHANGE UP (ref 3.8–10.5)
WBC # FLD AUTO: 5.11 K/UL — SIGNIFICANT CHANGE UP (ref 3.8–10.5)

## 2018-03-17 RX ADMIN — LOSARTAN POTASSIUM 50 MILLIGRAM(S): 100 TABLET, FILM COATED ORAL at 05:58

## 2018-03-17 RX ADMIN — ENOXAPARIN SODIUM 40 MILLIGRAM(S): 100 INJECTION SUBCUTANEOUS at 11:36

## 2018-03-17 RX ADMIN — Medication 15 MILLIGRAM(S): at 06:45

## 2018-03-17 RX ADMIN — Medication 15 MILLIGRAM(S): at 05:58

## 2018-03-18 ENCOUNTER — TRANSCRIPTION ENCOUNTER (OUTPATIENT)
Age: 78
End: 2018-03-18

## 2018-03-18 VITALS
TEMPERATURE: 98 F | DIASTOLIC BLOOD PRESSURE: 49 MMHG | RESPIRATION RATE: 16 BRPM | OXYGEN SATURATION: 96 % | HEART RATE: 65 BPM | SYSTOLIC BLOOD PRESSURE: 117 MMHG

## 2018-03-18 LAB
BUN SERPL-MCNC: 22 MG/DL — SIGNIFICANT CHANGE UP (ref 7–23)
CALCIUM SERPL-MCNC: 9.2 MG/DL — SIGNIFICANT CHANGE UP (ref 8.4–10.5)
CHLORIDE SERPL-SCNC: 99 MMOL/L — SIGNIFICANT CHANGE UP (ref 98–107)
CO2 SERPL-SCNC: 25 MMOL/L — SIGNIFICANT CHANGE UP (ref 22–31)
CREAT SERPL-MCNC: 1.04 MG/DL — SIGNIFICANT CHANGE UP (ref 0.5–1.3)
GLUCOSE SERPL-MCNC: 79 MG/DL — SIGNIFICANT CHANGE UP (ref 70–99)
HCT VFR BLD CALC: 40.5 % — SIGNIFICANT CHANGE UP (ref 34.5–45)
HGB BLD-MCNC: 12.8 G/DL — SIGNIFICANT CHANGE UP (ref 11.5–15.5)
MAGNESIUM SERPL-MCNC: 2.6 MG/DL — SIGNIFICANT CHANGE UP (ref 1.6–2.6)
MCHC RBC-ENTMCNC: 28.6 PG — SIGNIFICANT CHANGE UP (ref 27–34)
MCHC RBC-ENTMCNC: 31.6 % — LOW (ref 32–36)
MCV RBC AUTO: 90.4 FL — SIGNIFICANT CHANGE UP (ref 80–100)
NRBC # FLD: 0 — SIGNIFICANT CHANGE UP
PLATELET # BLD AUTO: 216 K/UL — SIGNIFICANT CHANGE UP (ref 150–400)
PMV BLD: 11 FL — SIGNIFICANT CHANGE UP (ref 7–13)
POTASSIUM SERPL-MCNC: 4.6 MMOL/L — SIGNIFICANT CHANGE UP (ref 3.5–5.3)
POTASSIUM SERPL-SCNC: 4.6 MMOL/L — SIGNIFICANT CHANGE UP (ref 3.5–5.3)
RBC # BLD: 4.48 M/UL — SIGNIFICANT CHANGE UP (ref 3.8–5.2)
RBC # FLD: 12.9 % — SIGNIFICANT CHANGE UP (ref 10.3–14.5)
SODIUM SERPL-SCNC: 137 MMOL/L — SIGNIFICANT CHANGE UP (ref 135–145)
WBC # BLD: 5.3 K/UL — SIGNIFICANT CHANGE UP (ref 3.8–10.5)
WBC # FLD AUTO: 5.3 K/UL — SIGNIFICANT CHANGE UP (ref 3.8–10.5)

## 2018-03-18 PROCEDURE — 93306 TTE W/DOPPLER COMPLETE: CPT | Mod: 26

## 2018-03-18 PROCEDURE — 78452 HT MUSCLE IMAGE SPECT MULT: CPT | Mod: 26

## 2018-03-18 PROCEDURE — 93016 CV STRESS TEST SUPVJ ONLY: CPT | Mod: GC

## 2018-03-18 PROCEDURE — 93018 CV STRESS TEST I&R ONLY: CPT | Mod: GC

## 2018-03-18 RX ORDER — SIMVASTATIN 20 MG/1
1 TABLET, FILM COATED ORAL
Qty: 30 | Refills: 0 | OUTPATIENT
Start: 2018-03-18 | End: 2018-04-16

## 2018-03-18 RX ORDER — SIMVASTATIN 20 MG/1
20 TABLET, FILM COATED ORAL AT BEDTIME
Qty: 0 | Refills: 0 | Status: DISCONTINUED | OUTPATIENT
Start: 2018-03-18 | End: 2018-03-18

## 2018-03-18 RX ORDER — SIMVASTATIN 20 MG/1
1 TABLET, FILM COATED ORAL
Qty: 0 | Refills: 0 | COMMUNITY
Start: 2018-03-18

## 2018-03-18 RX ADMIN — LOSARTAN POTASSIUM 50 MILLIGRAM(S): 100 TABLET, FILM COATED ORAL at 06:21

## 2018-03-18 NOTE — DISCHARGE NOTE ADULT - PLAN OF CARE
Follow up with pulmonology within 1-2 weeks for further work up of this pulmonary nodule.  You will need a follow up repeat CT scan of your chest in 6-12 months then another CT scan of your chest at 18-24 months. Prevention of future episodes. Continue medications as prescribed.  Stay well hydrated and eat a well balanced diet.  Follow up with your PCP and cardiology within 1-2 weeks; call for appointments. To maintain a normal blood pressure to prevent heart attack, stroke and renal failure. Low sodium and fat diet, continue anti-hypertensive medications, and follow up with primary care physician. Resolution of symptoms. You may take tylenol as needed for back pain.  Follow up with your PCP. Close outpatient pulmonology follow up and monitoring with serial CT scans. To maintain normal cholesterol levels to prevent stroke, coronary artery disease, peripheral vascular disease and heart attacks. Low fat diet, exercise daily and continue current medications (zocor). Follow up with primary care physician and cardiologist for management. You may take Tylenol as needed for back pain.  Follow up with your PCP. Close outpatient pulmonology follow up and monitoring with serial CT scan. Follow up with pulmonology within 1-2 weeks for further work up of this pulmonary nodule.  You will need a follow up repeat CT scan of your chest in 12 months to monitor this pulmonary nodule.  If you do not have a pulmonologist your PCP may refer you to someone or you may follow up in the clinic at [facility] by calling [phone] to make an appointment.

## 2018-03-18 NOTE — DISCHARGE NOTE ADULT - CARE PLAN
Principal Discharge DX:	Syncope and collapse  Secondary Diagnosis:	HTN (hypertension)  Secondary Diagnosis:	Low back pain  Secondary Diagnosis:	Pulmonary nodule  Assessment and plan of treatment:	Follow up with pulmonology within 1-2 weeks for further work up of this pulmonary nodule.  You will need a follow up repeat CT scan of your chest in 6-12 months then another CT scan of your chest at 18-24 months. Principal Discharge DX:	Syncope and collapse  Goal:	Prevention of future episodes.  Assessment and plan of treatment:	Continue medications as prescribed.  Stay well hydrated and eat a well balanced diet.  Follow up with your PCP and cardiology within 1-2 weeks; call for appointments.  Secondary Diagnosis:	HTN (hypertension)  Goal:	To maintain a normal blood pressure to prevent heart attack, stroke and renal failure.  Assessment and plan of treatment:	Low sodium and fat diet, continue anti-hypertensive medications, and follow up with primary care physician.  Secondary Diagnosis:	Low back pain  Goal:	Resolution of symptoms.  Assessment and plan of treatment:	You may take tylenol as needed for back pain.  Follow up with your PCP.  Secondary Diagnosis:	Pulmonary nodule  Goal:	Close outpatient pulmonology follow up and monitoring with serial CT scans.  Assessment and plan of treatment:	Follow up with pulmonology within 1-2 weeks for further work up of this pulmonary nodule.  You will need a follow up repeat CT scan of your chest in 6-12 months then another CT scan of your chest at 18-24 months. Principal Discharge DX:	Syncope and collapse  Goal:	Prevention of future episodes.  Assessment and plan of treatment:	Continue medications as prescribed.  Stay well hydrated and eat a well balanced diet.  Follow up with your PCP and cardiology within 1-2 weeks; call for appointments.  Secondary Diagnosis:	HTN (hypertension)  Goal:	To maintain a normal blood pressure to prevent heart attack, stroke and renal failure.  Assessment and plan of treatment:	Low sodium and fat diet, continue anti-hypertensive medications, and follow up with primary care physician.  Secondary Diagnosis:	Low back pain  Goal:	Resolution of symptoms.  Assessment and plan of treatment:	You may take tylenol as needed for back pain.  Follow up with your PCP.  Secondary Diagnosis:	Pulmonary nodule  Goal:	Close outpatient pulmonology follow up and monitoring with serial CT scans.  Assessment and plan of treatment:	Follow up with pulmonology within 1-2 weeks for further work up of this pulmonary nodule.  You will need a follow up repeat CT scan of your chest in 6-12 months then another CT scan of your chest at 18-24 months.  Secondary Diagnosis:	Hyperlipidemia  Goal:	To maintain normal cholesterol levels to prevent stroke, coronary artery disease, peripheral vascular disease and heart attacks.  Assessment and plan of treatment:	Low fat diet, exercise daily and continue current medications (zocor). Follow up with primary care physician and cardiologist for management. Principal Discharge DX:	Syncope and collapse  Goal:	Prevention of future episodes.  Assessment and plan of treatment:	Continue medications as prescribed.  Stay well hydrated and eat a well balanced diet.  Follow up with your PCP and cardiology within 1-2 weeks; call for appointments.  Secondary Diagnosis:	HTN (hypertension)  Goal:	To maintain a normal blood pressure to prevent heart attack, stroke and renal failure.  Assessment and plan of treatment:	Low sodium and fat diet, continue anti-hypertensive medications, and follow up with primary care physician.  Secondary Diagnosis:	Low back pain  Goal:	Resolution of symptoms.  Assessment and plan of treatment:	You may take Tylenol as needed for back pain.  Follow up with your PCP.  Secondary Diagnosis:	Pulmonary nodule  Goal:	Close outpatient pulmonology follow up and monitoring with serial CT scan.  Assessment and plan of treatment:	Follow up with pulmonology within 1-2 weeks for further work up of this pulmonary nodule.  You will need a follow up repeat CT scan of your chest in 12 months to monitor this pulmonary nodule.  If you do not have a pulmonologist your PCP may refer you to someone or you may follow up in the clinic at [facility] by calling [phone] to make an appointment.  Secondary Diagnosis:	Hyperlipidemia  Goal:	To maintain normal cholesterol levels to prevent stroke, coronary artery disease, peripheral vascular disease and heart attacks.  Assessment and plan of treatment:	Low fat diet, exercise daily and continue current medications (zocor). Follow up with primary care physician and cardiologist for management.

## 2018-03-18 NOTE — PROGRESS NOTE ADULT - PROBLEM SELECTOR PLAN 3
outpatient follow up with pulmonary

## 2018-03-18 NOTE — PROGRESS NOTE ADULT - PROBLEM SELECTOR PLAN 4
acceptable   will continue to monitor

## 2018-03-18 NOTE — DISCHARGE NOTE ADULT - ADDITIONAL INSTRUCTIONS
Follow up with your PCP and cardiology within 1-2 weeks; call for appointments. Follow up with your PCP and cardiology within 1-2 weeks; call for appointments.  Follow up with pulmonology within 1-2 weeks for further work up of your pulmonary nodule; call for appointment.  You will need a follow up repeat CT scan of your chest in 6-12 months then another CT scan of your chest at 18-24 months.  If you do not have a pulmonologist your PCP may refer you to someone or you may follow up in the clinic at [facility] by calling [phone] to make an appointment. Follow up with your PCP and cardiology within 1-2 weeks; call for appointments.  Follow up with pulmonology within 1-2 weeks for further work up of your pulmonary nodule; call for appointment.  You will need a follow up repeat CT scan of your chest in 12 months to monitor this pulmonary nodule.  If you do not have a pulmonologist your PCP may refer you to someone or you may follow up in the clinic at [facility] by calling [phone] to make an appointment.

## 2018-03-18 NOTE — PROGRESS NOTE ADULT - ASSESSMENT
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 EKG: Sinus Tachycardia w/ TWI in V1-V2. CT Head: Negative for acute hemorrhage, CT Angio Chest- Positive for 5 mm nodule in fissure, negative for aortic dissection. Admit to telemetry for syncope w/u.
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 EKG: Sinus Tachycardia w/ TWI in V1-V2. CT Head: Negative for acute hemorrhage, CT Angio Chest- Positive for 5 mm nodule in fissure, negative for aortic dissection. Admit to telemetry for syncope w/u.
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 EKG: Sinus Tachycardia w/ TWI in V1-V2. CT Head: Negative for acute hemorrhage, CT Angio Chest- Positive for 5 mm nodule in fissure, negative for aortic dissection. Admit to telemetry for syncope w/u.

## 2018-03-18 NOTE — PROGRESS NOTE ADULT - PROBLEM SELECTOR PLAN 2
work up per cardiology attending   asymptomatic

## 2018-03-18 NOTE — PROGRESS NOTE ADULT - ATTENDING COMMENTS
discharge planning per cardiology attending
discussed with patient in detail, all questions answered  discussed with family at bedside in detail
discussed with patient in detail, all questions answered  discussed with family at bedside in detail

## 2018-03-18 NOTE — DISCHARGE NOTE ADULT - INSTRUCTIONS
Low salt, low fat diet. PLEASE BRING ALL DISCHARGE PAPERWORK WITH YOU TO YOUR FOLLOW-UP APPOINTMENTS.

## 2018-03-18 NOTE — DISCHARGE NOTE ADULT - NS DC  LANGUAGE ASSIST NO REASON
Patient/caregiver requests family/friend to interpret./RN on unit speak Kiswahili and is able to translate

## 2018-03-18 NOTE — DISCHARGE NOTE ADULT - CARE PROVIDER_API CALL
Vicente Garcia), Internal Medicine  [address] [place] [address]  Phone: [phone]  Fax: [phone]    Vanessa De Leon), Cardiovascular Disease; Internal Medicine; Interventional Cardiology  [address] [place] [address]  Phone: [phone]  Fax: [phone]

## 2018-03-18 NOTE — PROGRESS NOTE ADULT - SUBJECTIVE AND OBJECTIVE BOX
Patient is a 77y old  Female who presents with a chief complaint of Syncope (15 Mar 2018 08:05)    SUBJECTIVE / OVERNIGHT EVENTS: no overnight events    ROS:  Resp: No cough no sputum production  CVS: No chest pain no palpitations no orthopnea  GI: no N/V/D  : no dysuria, no hematuria  Neuro: no weakness no paresthesias  Heme: No petechiae no easy bruising  Msk: No joint pain no swelling  Skin: No rash no itching  All other ROS negative       MEDICATIONS  (STANDING):  enoxaparin Injectable 40 milliGRAM(s) SubCutaneous daily  influenza   Vaccine 0.5 milliLiter(s) IntraMuscular once  losartan 50 milliGRAM(s) Oral daily    MEDICATIONS  (PRN):  acetaminophen   Tablet. 650 milliGRAM(s) Oral every 6 hours PRN Mild Pain (1 - 3)        CAPILLARY BLOOD GLUCOSE        I&O's Summary      Vital Signs Last 24 Hrs  T(C): 36.7 (17 Mar 2018 11:42), Max: 36.7 (17 Mar 2018 11:42)  T(F): 98 (17 Mar 2018 11:42), Max: 98 (17 Mar 2018 11:42)  HR: 59 (17 Mar 2018 11:42) (59 - 83)  BP: 138/55 (17 Mar 2018 11:42) (108/44 - 138/55)  BP(mean): --  RR: 17 (17 Mar 2018 11:42) (17 - 18)  SpO2: 97% (17 Mar 2018 11:42) (97% - 100%)    PHYSICAL EXAM:  GENERAL: NAD, well-developed  HEAD:  Atraumatic, Normocephalic  EYES: EOMI, PERRLA, conjunctiva and sclera clear  NECK: Supple, No JVD  CHEST/LUNG: Clear to auscultation bilaterally; No wheeze  HEART: S1S2; soft ejection systolic murmur best heard at left sternal border   ABDOMEN: Soft, Nontender, Nondistended; Bowel sounds present  EXTREMITIES:  + Peripheral Pulses, No clubbing or cyanosis, no edema  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  PSYCH: AO x 3, pleasant  NEUROLOGY: Alert, no focal motor or sensory deficits  SKIN: No rashes or lesions  left low back pain resolved     LABS:                        12.1   5.11  )-----------( 195      ( 17 Mar 2018 06:41 )             37.0     03-17    138  |  100  |  17  ----------------------------<  80  5.0   |  25  |  1.00    Ca    8.9      17 Mar 2018 06:41  Mg     2.1     03-17            Urinalysis Basic - ( 16 Mar 2018 08:02 )    Color: COLORL / Appearance: CLEAR / S.011 / pH: 5.5  Gluc: NEGATIVE / Ketone: NEGATIVE  / Bili: NEGATIVE / Urobili: NORMAL mg/dL   Blood: NEGATIVE / Protein: NEGATIVE mg/dL / Nitrite: NEGATIVE   Leuk Esterase: SMALL / RBC: 0-2 / WBC 5-10   Sq Epi: OCC / Non Sq Epi: x / Bacteria: x          Consultant(s) Notes Reviewed:      Care Discussed with Consultants/Other Providers:    Contact Number, Dr Saucedo [phone]
Patient is a 77y old  Female who presents with a chief complaint of Syncope (15 Mar 2018 08:05)    SUBJECTIVE / OVERNIGHT EVENTS: no overnight events  still c/o left low back pain    ROS:  Resp: No cough no sputum production  CVS: No chest pain no palpitations no orthopnea  GI: no N/V/D  : no dysuria, no hematuria  Neuro: no weakness no paresthesias  Heme: No petechiae no easy bruising  Msk: + LBP no swelling  Skin: No rash no itching  All other ROS negative       MEDICATIONS  (STANDING):  enoxaparin Injectable 40 milliGRAM(s) SubCutaneous daily  influenza   Vaccine 0.5 milliLiter(s) IntraMuscular once  ketorolac   Injectable 15 milliGRAM(s) IV Push every 8 hours  losartan 50 milliGRAM(s) Oral daily    MEDICATIONS  (PRN):  acetaminophen   Tablet. 650 milliGRAM(s) Oral every 6 hours PRN Mild Pain (1 - 3)        CAPILLARY BLOOD GLUCOSE        I&O's Summary      Vital Signs Last 24 Hrs  T(C): 36.5 (16 Mar 2018 15:11), Max: 36.8 (15 Mar 2018 18:00)  T(F): 97.7 (16 Mar 2018 15:11), Max: 98.2 (15 Mar 2018 18:00)  HR: 76 (16 Mar 2018 15:11) (66 - 93)  BP: 118/51 (16 Mar 2018 15:11) (118/51 - 147/60)  BP(mean): --  RR: 17 (16 Mar 2018 15:11) (16 - 18)  SpO2: 100% (16 Mar 2018 15:11) (98% - 100%)    PHYSICAL EXAM:  GENERAL: NAD, well-developed  HEAD:  Atraumatic, Normocephalic  EYES: EOMI, PERRLA, conjunctiva and sclera clear  NECK: Supple, No JVD  CHEST/LUNG: Clear to auscultation bilaterally; No wheeze  HEART: S1S2; soft ejection systolic murmur best heard at left sternal border   ABDOMEN: Soft, Nontender, Nondistended; Bowel sounds present  EXTREMITIES:  + Peripheral Pulses, No clubbing or cyanosis, no edema  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  PSYCH: AO x 3, pleasant  NEUROLOGY: Alert, no focal motor or sensory deficits  SKIN: No rashes or lesions  reproducible left low back pain    LABS:                        12.9   5.13  )-----------( 201      ( 16 Mar 2018 09:52 )             39.0     03-16    140  |  100  |  19  ----------------------------<  99  4.2   |  28  |  0.93    Ca    8.9      16 Mar 2018 09:52  Phos  3.4     03-15  Mg     2.2     03-16    TPro  8.1  /  Alb  4.1  /  TBili  0.4  /  DBili  x   /  AST  44<H>  /  ALT  23  /  AlkPhos  65  03-14      CARDIAC MARKERS ( 15 Mar 2018 10:09 )  x     / < 0.06 ng/mL / 86 u/L / 1.15 ng/mL / x      CARDIAC MARKERS ( 15 Mar 2018 05:00 )  x     / < 0.06 ng/mL / 78 u/L / 1.10 ng/mL / x      CARDIAC MARKERS ( 14 Mar 2018 18:02 )  x     / < 0.06 ng/mL / 146 u/L / 1.77 ng/mL / x          Urinalysis Basic - ( 16 Mar 2018 08:02 )    Color: COLORL / Appearance: CLEAR / S.011 / pH: 5.5  Gluc: NEGATIVE / Ketone: NEGATIVE  / Bili: NEGATIVE / Urobili: NORMAL mg/dL   Blood: NEGATIVE / Protein: NEGATIVE mg/dL / Nitrite: NEGATIVE   Leuk Esterase: SMALL / RBC: 0-2 / WBC 5-10   Sq Epi: OCC / Non Sq Epi: x / Bacteria: x          Consultant(s) Notes Reviewed:      Care Discussed with Consultants/Other Providers:    Contact Number, Dr Saucedo [phone]
Subjective: No chest pain or sob   	  MEDICATIONS:  MEDICATIONS  (STANDING):  enoxaparin Injectable 40 milliGRAM(s) SubCutaneous daily  influenza   Vaccine 0.5 milliLiter(s) IntraMuscular once  losartan 50 milliGRAM(s) Oral daily      LABS:	 	    CARDIAC MARKERS:  CARDIAC MARKERS ( 15 Mar 2018 10:09 )  x     / < 0.06 ng/mL / 86 u/L / 1.15 ng/mL / x      CARDIAC MARKERS ( 15 Mar 2018 05:00 )  x     / < 0.06 ng/mL / 78 u/L / 1.10 ng/mL / x      CARDIAC MARKERS ( 14 Mar 2018 18:02 )  x     / < 0.06 ng/mL / 146 u/L / 1.77 ng/mL / x                                    12.9   5.13  )-----------( 201      ( 16 Mar 2018 09:52 )             39.0     03-16    140  |  100  |  19  ----------------------------<  99  4.2   |  28  |  0.93    Ca    8.9      16 Mar 2018 09:52  Phos  3.4     03-15  Mg     2.2     03-16    TPro  8.1  /  Alb  4.1  /  TBili  0.4  /  DBili  x   /  AST  44<H>  /  ALT  23  /  AlkPhos  65  03-14    proBNP:   Lipid Profile:   HgA1c:   TSH:       PHYSICAL EXAM:  T(C): 36.5 (03-16-18 @ 15:11), Max: 36.8 (03-15-18 @ 18:00)  HR: 76 (03-16-18 @ 15:11) (66 - 93)  BP: 118/51 (03-16-18 @ 15:11) (118/51 - 147/60)  RR: 17 (03-16-18 @ 15:11) (16 - 18)  SpO2: 100% (03-16-18 @ 15:11) (98% - 100%)  Wt(kg): --  I&O's Summary        Appearance: Normal	  HEENT:   Normal oral mucosa, PERRL, EOMI	  Lymphatic: No lymphadenopathy , no edema  Cardiovascular: Normal S1 S2, No JVD, No murmurs , Peripheral pulses palpable 2+ bilaterally  Respiratory: Lungs clear to auscultation, normal effort 	  Gastrointestinal:  Soft, Non-tender, + BS	  Skin: No rashes, No ecchymoses, No cyanosis, warm to touch  Musculoskeletal: Normal range of motion, normal strength  Psychiatry:  Mood & affect appropriate    TELEMETRY: SR, PAC's	    ECG:  < from: 12 Lead ECG (03.14.18 @ 16:48) >  Sinus tachycardiawith frequent Premature ventricular complexes  Left axis deviation  Cannot rule out Anterior infarct , age undetermined  Abnormal ECG    < end of copied text >  	  RADIOLOGY:   DIAGNOSTIC TESTING:  [ ] Echocardiogram:  [ ]  Catheterization:  [ ] Stress Test:    OTHER: 	      ASSESSMENT/PLAN: 	77y Female with HTN, sarcoma s/p resection admitted with syncope.   -Check TTE  -monitor tele  -EPS follow up   -check NST to rule out CAD  -medicine follow up    Vanessa De Leon MD  [facility]  [address] [place] [address], [address] [place] [address]  office: [phone]  pager: [phone]
Subjective: No chest pain or sob       Cardiovascular: Normal S1 S2, No JVD, No murmurs ,  Respiratory: Lungs clear to auscultation, normal effort 	  Gastrointestinal:  Soft, Non-tender, + BS	  Extremities no e/c/c bl le's    Peripheral pulses palpable 2+ bilaterally      TELEMETRY: SR, PAC's	    ECG:  < from: 12 Lead ECG (03.14.18 @ 16:48) >  Sinus tachycardiawith frequent Premature ventricular complexes  Left axis deviation  Cannot rule out Anterior infarct , age undetermined  Abnormal ECG    < end of copied text >  	  RADIOLOGY:   < from: CT Angio Chest w/ IV Cont (03.14.18 @ 22:59) >  IMPRESSION:     No dissection of the aorta.    5 mm solid nodule along the horizontal fissure, possibly an intrafissural   node. Fleischner society recommendations for incidental pulmonary nodule   follow-up:     >4-6mm:   Non-smoker: 12 month follow-up chest CT recommended and if unchanged, no   further follow-up necessary.   Smoker: Initial follow-up chest CT at 6-12 months then at 18-24 months if   no change.    < end of copied text >    < from: Xray Chest 1 View-PORTABLE IMMEDIATE (03.14.18 @ 18:40) >  IMPRESSION:   Clear lungs.      < end of copied text >    DIAGNOSTIC TESTING:  [ ] Echocardiogram:  [ ]  Catheterization:  [ ] Stress Test:    OTHER: 	      ASSESSMENT/PLAN: 	77y Female with HTN, sarcoma s/p resection admitted with syncope.     -CTA no aorta dissection   -Check TTE  -monitor tele  -EPS follow up   -check NST to rule out CAD  -medicine follow up
Subjective: No chest pain or sob     MEDICATIONS  (STANDING):  enoxaparin Injectable 40 milliGRAM(s) SubCutaneous daily  influenza   Vaccine 0.5 milliLiter(s) IntraMuscular once  losartan 50 milliGRAM(s) Oral daily    MEDICATIONS  (PRN):  acetaminophen   Tablet. 650 milliGRAM(s) Oral every 6 hours PRN Mild Pain (1 - 3)    LABS:                        12.8   5.30  )-----------( 216      ( 18 Mar 2018 06:45 )             40.5     137  |  99  |  22  ----------------------------<  79  4.6   |  25  |  1.04    Ca    9.2      18 Mar 2018 06:45  Mg     2.6     03-18    Creatinine Trend: 1.04<--, 1.00<--, 0.93<--, 0.94<--, 1.05<--     PHYSICAL EXAM  Vital Signs Last 24 Hrs  T(C): 36.7 (18 Mar 2018 06:19), Max: 36.7 (17 Mar 2018 22:39)  T(F): 98.1 (18 Mar 2018 06:19), Max: 98.1 (18 Mar 2018 06:19)  HR: 66 (18 Mar 2018 08:00) (66 - 73)  BP: 131/56 (18 Mar 2018 06:19) (104/60 - 131/56)  RR: 16 (18 Mar 2018 08:00) (16 - 18)  SpO2: 97% (18 Mar 2018 06:19) (97% - 100%)    Cardiovascular: Normal S1 S2, No JVD, No murmurs  Respiratory: Lungs clear to auscultation, normal effort 	  Gastrointestinal:  Soft, Non-tender, + BS	  Ext: No C/C/E B/L LE    DIAGNOSTIC DATA:    TELEMETRY: SR, PAC's	      < from: 12 Lead ECG (03.14.18 @ 16:48) >  Sinus tachycardia with frequent Premature ventricular complexes  Left axis deviation  Cannot rule out Anterior infarct , age undetermined  Abnormal ECG    < end of copied text >  	  < from: CT Angio Chest w/ IV Cont (03.14.18 @ 22:59) >  IMPRESSION:     No dissection of the aorta.    5 mm solid nodule along the horizontal fissure, possibly an intrafissural   node. Fleischner society recommendations for incidental pulmonary nodule   follow-up:     >4-6mm:   Non-smoker: 12 month follow-up chest CT recommended and if unchanged, no   further follow-up necessary.   Smoker: Initial follow-up chest CT at 6-12 months then at 18-24 months if   no change.    < end of copied text >    < from: Xray Chest 1 View-PORTABLE IMMEDIATE (03.14.18 @ 18:40) >  IMPRESSION:   Clear lungs.  < end of copied text >      < from: Transthoracic Echocardiogram (03.18.18 @ 10:03) >  CONCLUSIONS:  1. Normal mitral valve. Mild mitral regurgitation.  2. Normal trileaflet aortic valve. Mild aortic  regurgitation  3. Normal left ventricular internal dimensions and wall  thicknesses.  4. Normal left ventricular systolic function. No segmental  wall motion abnormalities.  5. Mild diastolic dysfunction (Stage I).  6. Normal right ventricular size and function.  7. Normal pericardium with no pericardial effusion.  *** No previous Echo exam.  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     -CTA Chest with no aorta dissection   -EP f/u noted, episode on admit likely vasovagal, Tele with SR/APCs  -TTE noted  -F/U NST  -if no ischemia, DC planning  - Pulm nodule on CT noted, needs repeat imaging in 12 months (non smoker) per report    Pauly Torres PA-C  [facility]  [address], [address]   [place] [address]  office [phone]  pager [phone]
Patient is a 77y old  Female who presents with a chief complaint of Syncope (18 Mar 2018 06:48)      SUBJECTIVE / OVERNIGHT EVENTS: no overnight events  back pain better    ROS:  Resp: No cough no sputum production  CVS: No chest pain no palpitations no orthopnea  GI: no N/V/D  : no dysuria, no hematuria  Neuro: no weakness no paresthesias  Heme: No petechiae no easy bruising  Msk: No joint pain no swelling  Skin: No rash no itching  All other ROS negative       MEDICATIONS  (STANDING):  enoxaparin Injectable 40 milliGRAM(s) SubCutaneous daily  influenza   Vaccine 0.5 milliLiter(s) IntraMuscular once  losartan 50 milliGRAM(s) Oral daily  simvastatin 20 milliGRAM(s) Oral at bedtime    MEDICATIONS  (PRN):  acetaminophen   Tablet. 650 milliGRAM(s) Oral every 6 hours PRN Mild Pain (1 - 3)        CAPILLARY BLOOD GLUCOSE        I&O's Summary      Vital Signs Last 24 Hrs  T(C): 36.4 (18 Mar 2018 13:58), Max: 36.7 (17 Mar 2018 22:39)  T(F): 97.5 (18 Mar 2018 13:58), Max: 98.1 (18 Mar 2018 06:19)  HR: 65 (18 Mar 2018 13:58) (65 - 73)  BP: 117/49 (18 Mar 2018 13:58) (104/60 - 131/56)  BP(mean): --  RR: 16 (18 Mar 2018 13:58) (16 - 18)  SpO2: 96% (18 Mar 2018 13:58) (96% - 100%)    GENERAL: NAD, well-developed  HEAD:  Atraumatic, Normocephalic  EYES: EOMI, PERRLA, conjunctiva and sclera clear  NECK: Supple, No JVD  CHEST/LUNG: Clear to auscultation bilaterally; No wheeze  HEART: S1S2; soft ejection systolic murmur best heard at left sternal border   ABDOMEN: Soft, Nontender, Nondistended; Bowel sounds present  EXTREMITIES:  + Peripheral Pulses, No clubbing or cyanosis, no edema  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  PSYCH: AO x 3, pleasant  NEUROLOGY: Alert, no focal motor or sensory deficits  SKIN: No rashes or lesions      LABS:                        12.8   5.30  )-----------( 216      ( 18 Mar 2018 06:45 )             40.5     03-18    137  |  99  |  22  ----------------------------<  79  4.6   |  25  |  1.04    Ca    9.2      18 Mar 2018 06:45  Mg     2.6     03-18                  All consultant(s) notes reviewed and care discussed with other providers    Contact Number, Dr Saucedo [phone]

## 2018-03-18 NOTE — DISCHARGE NOTE ADULT - MEDICATION SUMMARY - MEDICATIONS TO TAKE
I will START or STAY ON the medications listed below when I get home from the hospital:    losartan 50 mg oral tablet  -- 1 tab(s) by mouth once a day  -- Indication: For High blood pressure I will START or STAY ON the medications listed below when I get home from the hospital:    losartan 50 mg oral tablet  -- 1 tab(s) by mouth once a day  -- Indication: For High blood pressure    simvastatin 20 mg oral tablet  -- 1 tab(s) by mouth once a day (at bedtime)  -- Indication: For High cholesterol

## 2018-03-18 NOTE — DISCHARGE NOTE ADULT - PATIENT PORTAL LINK FT
You can access the [facility], offered by [facility], by registering with the following website: [email or web address]

## 2018-03-18 NOTE — DISCHARGE NOTE ADULT - HOSPITAL COURSE
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 (Daughter was present to translate). As per daughter, yesterday at 9am, pt experienced severe lower back and left flank pain, 10/10, while sitting in her kitchen. She got up to get some water, and when she sat back down she felt lightheaded and slumped from the chair to the floor. She remembers sliding from the chair to the floor, and was observed falling by her other daughter. States she got up from the floor w/in 30 seconds and began to feel lightheaded again and fell, from standing, back to the floor (witnessed). After a minute, she got up again, ate some breakfast and went to sleep for a few hours. Upon awaking, she was still in severe pain and was brought in to the hospital by her daughter. Pt states that she has had long standing lower back pain for multiple years, but it has never been to this severity. Pt denies any recent trauma, strenuous activity, CP, diaphoresis, SOB, FERNANDO, fever, cough, chills, N/V/D, constipation, frequency, urgency, dysuria, abdominal pain. No Urinary/ fecal incontinence or retention, or saddle anesthesia. Currently, Pt is sitting comfortably in bed, states that her pain has decreased significantly (2/10), but hurts when she touches her back or when she shifts in bed. (15 Mar 2018 08:05)    On admission:  EKG- ST @ 117 LAD, PRWP, TWI v1-2  CXR- clear lungs  CTA C/A/P- No dissection of the aorta. 5 mm solid nodule along the horizontal fissure, possibly an intrafissural node. Fleischner society recommendations for incidental pulmonary nodule  follow-up: >4-6mm:  Non-smoker: 12 month follow-up chest CT recommended and if unchanged, no further follow-up necessary.   Smoker: Initial follow-up chest CT at 6-12 months then at 18-24 months if no change.  CE neg x2  UA: neg  L-spine x-ray- No compression fractures, spondylolistheses, or spondylolysis defects. Multilevel small disc margin osteophytes otherwise preserved  intervertebral disc spaces.   Lt Hip X-ray- No fracture or dislocation. Preserved left hip joint space with smooth and intact articular surfaces and normally offset femoral head neck junction.  ESR 13 (WNL)   3/17 Orthostatics negative    Evaluated by cardiology: 77y Female with HTN, sarcoma s/p resection admitted with syncope. -CTA no aorta dissection  -Check TTE -monitor tele -EPS follow up -check NST to rule out CAD -medicine follow up    NST ______    Echo _______    INCOMPLETE  Patient to follow up with PCP and cardiology. 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 (Daughter was present to translate). As per daughter, yesterday at 9am, pt experienced severe lower back and left flank pain, 10/10, while sitting in her kitchen. She got up to get some water, and when she sat back down she felt lightheaded and slumped from the chair to the floor. She remembers sliding from the chair to the floor, and was observed falling by her other daughter. States she got up from the floor w/in 30 seconds and began to feel lightheaded again and fell, from standing, back to the floor (witnessed). After a minute, she got up again, ate some breakfast and went to sleep for a few hours. Upon awaking, she was still in severe pain and was brought in to the hospital by her daughter. Pt states that she has had long standing lower back pain for multiple years, but it has never been to this severity. Pt denies any recent trauma, strenuous activity, CP, diaphoresis, SOB, FERNANDO, fever, cough, chills, N/V/D, constipation, frequency, urgency, dysuria, abdominal pain. No Urinary/ fecal incontinence or retention, or saddle anesthesia. Currently, Pt is sitting comfortably in bed, states that her pain has decreased significantly (2/10), but hurts when she touches her back or when she shifts in bed. (15 Mar 2018 08:05)    On admission:  EKG- ST @ 117 LAD, PRWP, TWI v1-2  CXR- clear lungs  CTA C/A/P- No dissection of the aorta. 5 mm solid nodule along the horizontal fissure, possibly an intrafissural node. Fleischner society recommendations for incidental pulmonary nodule  follow-up: >4-6mm:  Non-smoker: 12 month follow-up chest CT recommended and if unchanged, no further follow-up necessary.   Smoker: Initial follow-up chest CT at 6-12 months then at 18-24 months if no change.  CE neg x2  UA: neg  L-spine x-ray- No compression fractures, spondylolistheses, or spondylolysis defects. Multilevel small disc margin osteophytes otherwise preserved  intervertebral disc spaces.   Lt Hip X-ray- No fracture or dislocation. Preserved left hip joint space with smooth and intact articular surfaces and normally offset femoral head neck junction.  ESR 13 (WNL)   3/17 Orthostatics negative   --> Started on zocor 20mg    Evaluated by cardiology: 77y Female with HTN, sarcoma s/p resection admitted with syncope. -CTA no aorta dissection  -Check TTE -monitor tele -EPS follow up -check NST to rule out CAD -medicine follow up    3/18 Echo: EF 63% 1. Normal mitral valve. Mild mitral regurgitation.  2. Normal trileaflet aortic valve. Mild aortic  regurgitation  3. Normal left ventricular internal dimensions and wall  thicknesses.  4. Normal left ventricular systolic function. No segmental  wall motion abnormalities.  5. Mild diastolic dysfunction (Stage I).  6. Normal right ventricular size and function.  7. Normal pericardium with no pericardial effusion.  *** No previous Echo exam.    3/18 Exercise stress test: IMPRESSIONS:Normal Study  * The left ventricle was normal in size.  * Tracer uptake was homogeneous throughout the left  ventricle.  * Normal study; no evidence for myocardial infarction or  ischemia.  * Gated wall motion analysis was performed, and shows  normal wall motion.    Patient cleared for discharge by cardiology. Patient to follow up with PCP and cardiology. 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 (Daughter was present to translate). As per daughter, yesterday at 9am, pt experienced severe lower back and left flank pain, 10/10, while sitting in her kitchen. She got up to get some water, and when she sat back down she felt lightheaded and slumped from the chair to the floor. She remembers sliding from the chair to the floor, and was observed falling by her other daughter. States she got up from the floor w/in 30 seconds and began to feel lightheaded again and fell, from standing, back to the floor (witnessed). After a minute, she got up again, ate some breakfast and went to sleep for a few hours. Upon awaking, she was still in severe pain and was brought in to the hospital by her daughter. Pt states that she has had long standing lower back pain for multiple years, but it has never been to this severity. Pt denies any recent trauma, strenuous activity, CP, diaphoresis, SOB, FERNANDO, fever, cough, chills, N/V/D, constipation, frequency, urgency, dysuria, abdominal pain. No Urinary/ fecal incontinence or retention, or saddle anesthesia. Currently, Pt is sitting comfortably in bed, states that her pain has decreased significantly (2/10), but hurts when she touches her back or when she shifts in bed. (15 Mar 2018 08:05)    On admission:  EKG- ST @ 117 LAD, PRWP, TWI v1-2  CXR- clear lungs  CTA C/A/P- No dissection of the aorta. 5 mm solid nodule along the horizontal fissure, possibly an intrafissural node. Fleischner society recommendations for incidental pulmonary nodule  follow-up: >4-6mm:  Non-smoker: 12 month follow-up chest CT recommended and if unchanged, no further follow-up necessary.   Smoker: Initial follow-up chest CT at 6-12 months then at 18-24 months if no change.  CE neg x2  UA: neg  L-spine x-ray- No compression fractures, spondylolistheses, or spondylolysis defects. Multilevel small disc margin osteophytes otherwise preserved  intervertebral disc spaces.   Lt Hip X-ray- No fracture or dislocation. Preserved left hip joint space with smooth and intact articular surfaces and normally offset femoral head neck junction.  ESR 13 (WNL)   3/17 Orthostatics negative   --> Started on zocor 20mg    Evaluated by cardiology: 77y Female with HTN, sarcoma s/p resection admitted with syncope. -CTA no aorta dissection  -Check TTE -monitor tele -EPS follow up -check NST to rule out CAD -medicine follow up    3/18 Echo: EF 63% 1. Normal mitral valve. Mild mitral regurgitation.  2. Normal trileaflet aortic valve. Mild aortic  regurgitation  3. Normal left ventricular internal dimensions and wall  thicknesses.  4. Normal left ventricular systolic function. No segmental  wall motion abnormalities.  5. Mild diastolic dysfunction (Stage I).  6. Normal right ventricular size and function.  7. Normal pericardium with no pericardial effusion.  *** No previous Echo exam.    3/18 Exercise stress test: IMPRESSIONS:Normal Study  * The left ventricle was normal in size.  * Tracer uptake was homogeneous throughout the left  ventricle.  * Normal study; no evidence for myocardial infarction or  ischemia.  * Gated wall motion analysis was performed, and shows  normal wall motion.    Patient cleared for discharge by cardiology. Patient to follow up with PCP and cardiology.   Patient to follow up with pulmonology as outpatient.  Repeat CT scan of chest to be done in 12 months with pulmonology to monitor pulmonary nodule (non-smoker).

## 2018-03-18 NOTE — PROGRESS NOTE ADULT - PROBLEM SELECTOR PLAN 1
work up negative  check ESR in AM  Toradol 15 mg x 2 doses
work up negative  check ESR in AM  s/p Toradol 15 mg x 2 doses  much better   no further doses required
work up negative  ESR normal   likely musculoskeletal

## 2018-03-19 DIAGNOSIS — R69 ILLNESS, UNSPECIFIED: ICD-10-CM

## 2018-03-26 PROBLEM — I10 ESSENTIAL (PRIMARY) HYPERTENSION: Chronic | Status: ACTIVE | Noted: 2018-03-14

## 2018-03-29 ENCOUNTER — OUTPATIENT (OUTPATIENT)
Dept: OUTPATIENT SERVICES | Facility: HOSPITAL | Age: 78
LOS: 1 days | Discharge: HOME | End: 2018-03-29

## 2018-03-29 DIAGNOSIS — Z98.51 TUBAL LIGATION STATUS: Chronic | ICD-10-CM

## 2018-03-29 DIAGNOSIS — C49.9 MALIGNANT NEOPLASM OF CONNECTIVE AND SOFT TISSUE, UNSPECIFIED: ICD-10-CM

## 2018-03-29 DIAGNOSIS — Z85.831 PERSONAL HISTORY OF MALIGNANT NEOPLASM OF SOFT TISSUE: Chronic | ICD-10-CM

## 2018-03-31 PROBLEM — R55 SYNCOPE, UNSPECIFIED SYNCOPE TYPE: Status: ACTIVE | Noted: 2018-03-31

## 2018-03-31 PROBLEM — S49.92XD INJURY OF LEFT SHOULDER, SUBSEQUENT ENCOUNTER: Status: RESOLVED | Noted: 2017-01-04 | Resolved: 2018-03-31

## 2018-03-31 PROBLEM — E78.00 HYPERCHOLESTEREMIA: Status: ACTIVE | Noted: 2018-03-31

## 2018-03-31 RX ORDER — SIMVASTATIN 20 MG/1
20 TABLET, FILM COATED ORAL
Refills: 0 | Status: ACTIVE | COMMUNITY

## 2018-04-02 ENCOUNTER — APPOINTMENT (OUTPATIENT)
Dept: HEMATOLOGY ONCOLOGY | Facility: CLINIC | Age: 78
End: 2018-04-02
Payer: MEDICAID

## 2018-04-02 VITALS
SYSTOLIC BLOOD PRESSURE: 127 MMHG | DIASTOLIC BLOOD PRESSURE: 78 MMHG | OXYGEN SATURATION: 100 % | WEIGHT: 145.99 LBS | HEART RATE: 60 BPM | RESPIRATION RATE: 16 BRPM | TEMPERATURE: 97.7 F | BODY MASS INDEX: 27.21 KG/M2 | HEIGHT: 61.5 IN

## 2018-04-02 DIAGNOSIS — I10 ESSENTIAL (PRIMARY) HYPERTENSION: ICD-10-CM

## 2018-04-02 DIAGNOSIS — R55 SYNCOPE AND COLLAPSE: ICD-10-CM

## 2018-04-02 DIAGNOSIS — R93.8 ABNORMAL FINDINGS ON DIAGNOSTIC IMAGING OF OTHER SPECIFIED BODY STRUCTURES: ICD-10-CM

## 2018-04-02 DIAGNOSIS — S49.92XD UNSPECIFIED INJURY OF LEFT SHOULDER AND UPPER ARM, SUBSEQUENT ENCOUNTER: ICD-10-CM

## 2018-04-02 DIAGNOSIS — E78.00 PURE HYPERCHOLESTEROLEMIA, UNSPECIFIED: ICD-10-CM

## 2018-04-02 DIAGNOSIS — S49.92XA UNSPECIFIED INJURY OF LEFT SHOULDER AND UPPER ARM, INITIAL ENCOUNTER: ICD-10-CM

## 2018-04-02 PROCEDURE — 99205 OFFICE O/P NEW HI 60 MIN: CPT

## 2018-04-04 ENCOUNTER — APPOINTMENT (OUTPATIENT)
Dept: PULMONOLOGY | Facility: CLINIC | Age: 78
End: 2018-04-04
Payer: MEDICAID

## 2018-04-04 VITALS
HEIGHT: 60.2 IN | BODY MASS INDEX: 28.48 KG/M2 | HEART RATE: 60 BPM | WEIGHT: 147 LBS | DIASTOLIC BLOOD PRESSURE: 70 MMHG | TEMPERATURE: 97.8 F | RESPIRATION RATE: 15 BRPM | SYSTOLIC BLOOD PRESSURE: 120 MMHG

## 2018-04-04 VITALS — WEIGHT: 147 LBS | HEIGHT: 60.2 IN | BODY MASS INDEX: 28.48 KG/M2

## 2018-04-04 DIAGNOSIS — R91.1 SOLITARY PULMONARY NODULE: ICD-10-CM

## 2018-04-04 PROCEDURE — ZZZZZ: CPT

## 2018-04-04 PROCEDURE — 94729 DIFFUSING CAPACITY: CPT | Mod: GC

## 2018-04-04 PROCEDURE — 94726 PLETHYSMOGRAPHY LUNG VOLUMES: CPT | Mod: GC

## 2018-04-04 PROCEDURE — 94010 BREATHING CAPACITY TEST: CPT | Mod: GC

## 2018-04-04 PROCEDURE — 99203 OFFICE O/P NEW LOW 30 MIN: CPT | Mod: 25,GC

## 2018-04-05 PROBLEM — R91.1 PULMONARY NODULE, RIGHT: Status: ACTIVE | Noted: 2018-04-05

## 2018-06-19 ENCOUNTER — APPOINTMENT (OUTPATIENT)
Dept: THORACIC SURGERY | Facility: CLINIC | Age: 78
End: 2018-06-19

## 2018-07-20 PROBLEM — C49.9 MALIGNANT NEOPLASM OF CONNECTIVE AND SOFT TISSUE, UNSPECIFIED: Chronic | Status: ACTIVE | Noted: 2018-03-15

## 2018-09-11 ENCOUNTER — OUTPATIENT (OUTPATIENT)
Dept: OUTPATIENT SERVICES | Facility: HOSPITAL | Age: 78
LOS: 1 days | Discharge: HOME | End: 2018-09-11

## 2018-09-11 DIAGNOSIS — C49.9 MALIGNANT NEOPLASM OF CONNECTIVE AND SOFT TISSUE, UNSPECIFIED: ICD-10-CM

## 2018-09-11 DIAGNOSIS — Z98.51 TUBAL LIGATION STATUS: Chronic | ICD-10-CM

## 2018-09-11 DIAGNOSIS — Z85.831 PERSONAL HISTORY OF MALIGNANT NEOPLASM OF SOFT TISSUE: Chronic | ICD-10-CM

## 2018-09-15 PROBLEM — C49.21: Status: ACTIVE | Noted: 2018-03-07

## 2018-09-17 ENCOUNTER — APPOINTMENT (OUTPATIENT)
Dept: HEMATOLOGY ONCOLOGY | Facility: CLINIC | Age: 78
End: 2018-09-17

## 2018-09-17 DIAGNOSIS — C49.21 MALIGNANT NEOPLASM OF CONNECTIVE AND SOFT TISSUE OF RIGHT LOWER LIMB, INCLUDING HIP: ICD-10-CM

## 2019-04-01 ENCOUNTER — OUTPATIENT (OUTPATIENT)
Dept: OUTPATIENT SERVICES | Facility: HOSPITAL | Age: 79
LOS: 1 days | End: 2019-04-01
Payer: MEDICAID

## 2019-04-01 DIAGNOSIS — Z98.51 TUBAL LIGATION STATUS: Chronic | ICD-10-CM

## 2019-04-01 DIAGNOSIS — Z85.831 PERSONAL HISTORY OF MALIGNANT NEOPLASM OF SOFT TISSUE: Chronic | ICD-10-CM

## 2019-04-03 DIAGNOSIS — Z71.89 OTHER SPECIFIED COUNSELING: ICD-10-CM

## 2020-10-12 NOTE — H&P ADULT - I WAS PHYSICALLY PRESENT FOR THE KEY PORTIONS OF THE EVALUATION AND MANAGEMENT (E/M) SERVICE PROVIDED.  I AGREE WITH THE ABOVE HISTORY, PHYSICAL, AND PLAN WHICH I HAVE REVIEWED AND EDITED WHERE APPROPRIATE
Patient notified. He states he had this back in 2011 and his cholesterol medication was changed. He would like to know if he needs a medication change?   Statement Selected

## 2021-05-02 ENCOUNTER — EMERGENCY (EMERGENCY)
Facility: HOSPITAL | Age: 81
LOS: 1 days | Discharge: HOME | End: 2021-05-02
Attending: EMERGENCY MEDICINE | Admitting: EMERGENCY MEDICINE
Payer: MEDICAID

## 2021-05-02 VITALS
RESPIRATION RATE: 16 BRPM | OXYGEN SATURATION: 100 % | DIASTOLIC BLOOD PRESSURE: 45 MMHG | HEART RATE: 112 BPM | HEIGHT: 60 IN | TEMPERATURE: 98 F | SYSTOLIC BLOOD PRESSURE: 124 MMHG

## 2021-05-02 VITALS
SYSTOLIC BLOOD PRESSURE: 161 MMHG | HEART RATE: 91 BPM | DIASTOLIC BLOOD PRESSURE: 63 MMHG | OXYGEN SATURATION: 100 % | RESPIRATION RATE: 14 BRPM

## 2021-05-02 DIAGNOSIS — Z85.831 PERSONAL HISTORY OF MALIGNANT NEOPLASM OF SOFT TISSUE: Chronic | ICD-10-CM

## 2021-05-02 DIAGNOSIS — Z98.51 TUBAL LIGATION STATUS: Chronic | ICD-10-CM

## 2021-05-02 LAB
ALBUMIN SERPL ELPH-MCNC: 4.2 G/DL — SIGNIFICANT CHANGE UP (ref 3.3–5)
ALP SERPL-CCNC: 61 U/L — SIGNIFICANT CHANGE UP (ref 40–120)
ALT FLD-CCNC: 14 U/L — SIGNIFICANT CHANGE UP (ref 4–33)
ANION GAP SERPL CALC-SCNC: 11 MMOL/L — SIGNIFICANT CHANGE UP (ref 7–14)
AST SERPL-CCNC: 24 U/L — SIGNIFICANT CHANGE UP (ref 4–32)
BASOPHILS # BLD AUTO: 0.03 K/UL — SIGNIFICANT CHANGE UP (ref 0–0.2)
BASOPHILS NFR BLD AUTO: 0.4 % — SIGNIFICANT CHANGE UP (ref 0–2)
BILIRUB SERPL-MCNC: 0.2 MG/DL — SIGNIFICANT CHANGE UP (ref 0.2–1.2)
BUN SERPL-MCNC: 28 MG/DL — HIGH (ref 7–23)
CALCIUM SERPL-MCNC: 9.4 MG/DL — SIGNIFICANT CHANGE UP (ref 8.4–10.5)
CHLORIDE SERPL-SCNC: 97 MMOL/L — LOW (ref 98–107)
CO2 SERPL-SCNC: 29 MMOL/L — SIGNIFICANT CHANGE UP (ref 22–31)
CREAT SERPL-MCNC: 1.32 MG/DL — HIGH (ref 0.5–1.3)
EOSINOPHIL # BLD AUTO: 0.12 K/UL — SIGNIFICANT CHANGE UP (ref 0–0.5)
EOSINOPHIL NFR BLD AUTO: 1.5 % — SIGNIFICANT CHANGE UP (ref 0–6)
GLUCOSE SERPL-MCNC: 108 MG/DL — HIGH (ref 70–99)
HCT VFR BLD CALC: 33.9 % — LOW (ref 34.5–45)
HGB BLD-MCNC: 10.8 G/DL — LOW (ref 11.5–15.5)
IANC: 5.15 K/UL — SIGNIFICANT CHANGE UP (ref 1.5–8.5)
IMM GRANULOCYTES NFR BLD AUTO: 0.3 % — SIGNIFICANT CHANGE UP (ref 0–1.5)
LYMPHOCYTES # BLD AUTO: 1.95 K/UL — SIGNIFICANT CHANGE UP (ref 1–3.3)
LYMPHOCYTES # BLD AUTO: 24.9 % — SIGNIFICANT CHANGE UP (ref 13–44)
MCHC RBC-ENTMCNC: 29.5 PG — SIGNIFICANT CHANGE UP (ref 27–34)
MCHC RBC-ENTMCNC: 31.9 GM/DL — LOW (ref 32–36)
MCV RBC AUTO: 92.6 FL — SIGNIFICANT CHANGE UP (ref 80–100)
MONOCYTES # BLD AUTO: 0.57 K/UL — SIGNIFICANT CHANGE UP (ref 0–0.9)
MONOCYTES NFR BLD AUTO: 7.3 % — SIGNIFICANT CHANGE UP (ref 2–14)
NEUTROPHILS # BLD AUTO: 5.15 K/UL — SIGNIFICANT CHANGE UP (ref 1.8–7.4)
NEUTROPHILS NFR BLD AUTO: 65.6 % — SIGNIFICANT CHANGE UP (ref 43–77)
NRBC # BLD: 0 /100 WBCS — SIGNIFICANT CHANGE UP
NRBC # FLD: 0 K/UL — SIGNIFICANT CHANGE UP
PLATELET # BLD AUTO: 196 K/UL — SIGNIFICANT CHANGE UP (ref 150–400)
POTASSIUM SERPL-MCNC: 4.2 MMOL/L — SIGNIFICANT CHANGE UP (ref 3.5–5.3)
POTASSIUM SERPL-SCNC: 4.2 MMOL/L — SIGNIFICANT CHANGE UP (ref 3.5–5.3)
PROT SERPL-MCNC: 7.1 G/DL — SIGNIFICANT CHANGE UP (ref 6–8.3)
RBC # BLD: 3.66 M/UL — LOW (ref 3.8–5.2)
RBC # FLD: 12.5 % — SIGNIFICANT CHANGE UP (ref 10.3–14.5)
SODIUM SERPL-SCNC: 137 MMOL/L — SIGNIFICANT CHANGE UP (ref 135–145)
TROPONIN T, HIGH SENSITIVITY RESULT: <6 NG/L — SIGNIFICANT CHANGE UP
WBC # BLD: 7.84 K/UL — SIGNIFICANT CHANGE UP (ref 3.8–10.5)
WBC # FLD AUTO: 7.84 K/UL — SIGNIFICANT CHANGE UP (ref 3.8–10.5)

## 2021-05-02 PROCEDURE — 71046 X-RAY EXAM CHEST 2 VIEWS: CPT | Mod: 26

## 2021-05-02 PROCEDURE — 99285 EMERGENCY DEPT VISIT HI MDM: CPT | Mod: 25

## 2021-05-02 PROCEDURE — 93010 ELECTROCARDIOGRAM REPORT: CPT

## 2021-05-02 RX ORDER — SODIUM CHLORIDE 9 MG/ML
1000 INJECTION INTRAMUSCULAR; INTRAVENOUS; SUBCUTANEOUS ONCE
Refills: 0 | Status: COMPLETED | OUTPATIENT
Start: 2021-05-02 | End: 2021-05-02

## 2021-05-02 RX ORDER — METOCLOPRAMIDE HCL 10 MG
10 TABLET ORAL ONCE
Refills: 0 | Status: COMPLETED | OUTPATIENT
Start: 2021-05-02 | End: 2021-05-02

## 2021-05-02 RX ORDER — FAMOTIDINE 10 MG/ML
20 INJECTION INTRAVENOUS ONCE
Refills: 0 | Status: COMPLETED | OUTPATIENT
Start: 2021-05-02 | End: 2021-05-02

## 2021-05-02 RX ADMIN — Medication 10 MILLIGRAM(S): at 20:07

## 2021-05-02 RX ADMIN — FAMOTIDINE 20 MILLIGRAM(S): 10 INJECTION INTRAVENOUS at 19:28

## 2021-05-02 RX ADMIN — SODIUM CHLORIDE 1000 MILLILITER(S): 9 INJECTION INTRAMUSCULAR; INTRAVENOUS; SUBCUTANEOUS at 20:07

## 2021-05-02 RX ADMIN — Medication 30 MILLILITER(S): at 20:06

## 2021-05-02 NOTE — ED ADULT TRIAGE NOTE - CHIEF COMPLAINT QUOTE
Per EMS Pt had a bowel movement and subsequently passed out witnessed by daughter who denies head strike.  Pt c/o abdominal pain

## 2021-05-02 NOTE — ED PROVIDER NOTE - PROGRESS NOTE DETAILS
WESTLEY Rollins: Attempted to reach daughter at number listed in chart, no answer. Cecilio Brown I received patient in sign out.  ID used is [id number]. Patient states she ate food, felt stomach discomfort, then passed out briefly while using bathroom. Son called for an update @ [phone].    PCP Dr. Carlin called for update. He will call pts daughter for office follow up. Cecilio - pt reassessed. pt symptomatically improved. vss. lab work reviewed with her. normal. Cr 1.34 will follow up with PCP return precautions given. [id number] ID number. Cecilio - pt reassessed. pt symptomatically improved. vss. lab work reviewed with her. normal. No GI discomfort.  Cr 1.34 will follow up with PCP return precautions given. [id number] ID number.
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
## 2021-05-02 NOTE — ED ADULT NURSE NOTE - NSIMPLEMENTINTERV_GEN_ALL_ED
Implemented All Fall Risk Interventions:  [place] to call system. Call bell, personal items and telephone within reach. Instruct patient to call for assistance. Room bathroom lighting operational. Non-slip footwear when patient is off stretcher. Physically safe environment: no spills, clutter or unnecessary equipment. Stretcher in lowest position, wheels locked, appropriate side rails in place. Provide visual cue, wrist band, yellow gown, etc. Monitor gait and stability. Monitor for mental status changes and reorient to person, place, and time. Review medications for side effects contributing to fall risk. Reinforce activity limits and safety measures with patient and family.

## 2021-05-02 NOTE — ED PROVIDER NOTE - NSFOLLOWUPINSTRUCTIONS_ED_ALL_ED_FT
You were seen for a passing out episode.     Follow up with your PCP this week.    Seek immediate medical assistance for any new or worsening symptoms.     Please take 600 mg of Ibuprofen (aka Motrin, Advil) and/or 650 mg Acetaminophen (aka Tylenol) every 6 hours, as needed, for mild-moderate pain.      A copy all lab results is provided for you.

## 2021-05-02 NOTE — ED PROVIDER NOTE - CLINICAL SUMMARY MEDICAL DECISION MAKING FREE TEXT BOX
80yF w/pmhx HTN, HLD presenting with syncope. Pt reports after eating lunch she had abdominal upset and fainted after a BM. History concerning for vasovagal syncope. No abdominal tenderness on exam, pt is well appearing. Given upper abd discomfort will r/o acs. Plan: cbc/cmp/trop, CXR, EKG, will trial pepcid. 80yF w/pmhx HTN, HLD presenting with apparent syncope while having BM a/w GI discomfort attributed to a recent meal. No CP or SOB, otherwise in USOH.  No PE risk factors.  Well appearing. No abdominal tenderness on exam, pt is well appearing. Possible vasovagal etiology.  However, will obtain labs to assess for ACS given upper abd pain and assess for occult infxn (no evidence). Plan: cbc/cmp/trop, CXR, EKG, will trial pepcid. Reassess.

## 2021-05-02 NOTE — ED PROVIDER NOTE - PATIENT PORTAL LINK FT
You can access the FollowMyHealth Patient Portal offered by [facility] by registering at the following website: [email or web address]. By joining [employer]’s FollowMyHealth portal, you will also be able to view your health information using other applications (apps) compatible with our system.

## 2021-05-02 NOTE — ED PROVIDER NOTE - ATTENDING CONTRIBUTION TO CARE
ED Attending (Dr Zambrano): I have personally performed a face to face bedside history and physical examination of this patient.  I have discussed the case with the PA and  I have personally authored the following components: HPI, ROS, Physical Exam and MDM in addition to reviewing and revising the rest of the Provider Note. 80yF w/pmhx HTN, HLD presenting with apparent syncope while having BM a/w GI discomfort attributed to a recent meal. No CP or SOB, otherwise in USOH.  No PE risk factors.  Well appearing. No abdominal tenderness on exam, pt is well appearing. Possible vasovagal etiology.  However, will obtain labs to assess for ACS given upper abd pain and assess for occult infxn (no evidence). Plan: cbc/cmp/trop, CXR, EKG, will trial pepcid. Reassess.

## 2021-05-18 ENCOUNTER — APPOINTMENT (OUTPATIENT)
Age: 81
End: 2021-05-18

## 2021-10-06 PROBLEM — I10 ESSENTIAL HYPERTENSION: Status: ACTIVE | Noted: 2018-03-31
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
## 2024-08-10 NOTE — ED PROVIDER NOTE - MUSCULOSKELETAL NEGATIVE STATEMENT, MLM
[FreeTextEntry1] : A/A/Ox 3 good mood but also emotional good attention follows 4 step commands Good color appreciation no field cut tendency to sit with a scoliotic ( leaning right) posture No drift no dysmetria stable gait + decreased sensation right L4 negative Romberg. 
no back pain, no gout, no musculoskeletal pain, no neck pain, and no weakness.

## 2024-08-22 NOTE — ED PROVIDER NOTE - CROS ED SKIN ALL NEG
8/22/2024      Joyce Smallwood  [address]   [place] [address]    Dear Ms. Smallwood,      Your procedure is scheduled with DR. ANDRÉS ALEXANDER on September 20, 2024.  YOU MUST REGISTER at [facility] (main entrance - first floor) at  8:40 am.      [facility]   Pain Management Center  [address] [place] [address]   [phone]    You can expect to be contacted 1-3 days prior to the surgery to confirm arrival and surgery time.     These times may change due to various OR schedule needs. We will call you ASAP if this happens.  PRE-PROCEDURE GENERAL GUIDELINES    No anti-inflammatory medications(e.g. Ibuprofen, Celebrex, etc) for 48 hours before procedure.      If you are taking a blood thinner medication (e.g Coumadin, Plavix, Eliquis, etc) please follow the directions given in regards to stopping the medication prior to procedure.  If not sure of instructions, please call nurse line at [phone]     Take all other medications (heart, blood pressure, etc) as you normally would unless directed otherwise.  If you are having sedation take with small sips of clear liquids no up to 2 hours prior to procedure.  DO NOT TAKE YOUR PAIN MEDICATION within 4 hours prior to procedure if you are scheduled for a diagnostic procedure (as in facet joint, genicular nerve block, SI joint)    If you develop an infection, illness or fever, please call before coming to your scheduled procedure appointment at [phone]    DO NOT eat or drink for 8 hours prior to procedure IF it has been ordered by physician for IV SEDATION.             Have a  with you at check in for all procedures having IV sedation. If  not present, you may not have sedation for procedure. (This is an all facility policy)(CAN NOT BE:  bus, van service, cab or Uber)    Bathe or shower the evening before and/or the morning of procedure.    Follow up with primary care  physician for all other medical concerns.          If you have any work related and/or disability forms that need to be completed, please contact the Forms Completion Department at [phone]. Forms can be dropped off at any of our [facility] locations. Please be advised that it can take 7 to 10 business days to complete these requests.    If you have questions regarding the procedure, medications, rehab, etc., please contact the nursing staff at [phone] (Pain Line).    If you have any scheduling questions or need to reschedule, please contact me at the telephone number and extension listed below.     We understand that unplanned events may cause you to miss your appointments.  If you are unable to attend the appointment for your procedure, give us at least 24 hours of notice if possible.  Please be aware that if you miss your procedure appointment without notifying us in advance, we may no longer be able to serve you as your pain management provider.       Thank you,      Delia [phone]   Surgery Scheduler for   [facility]  DR. ANDRÉS ALEXANDER  Pain Line at [phone]      \"Help us grow our quality of service. We want to improve - and you can help us. You may receive a survey either in the mail or via e-mail. This is your opportunity to tell us what we did well, and where we could use some improvement. We value your input.\"                      Insurance Authorization Need to Know’s    Prior to your surgical procedure, our team will contact your Insurance Company to initiate a PreAuthorization request.      This is not a guarantee of payment from your insurance company, but rather a step taken to ensure that we have all of the information and documentation for them to confirm the procedure is one that is eligible for coverage under your plan.    We will contact you if we either need more information from you to fulfill the requirements of your insurance company, or if we  need to discuss any concerns that may lead to postponement or cancellation of your procedure. If you have any questions regarding your surgery authorization, please check with your insurance company or call our office for an update.    If you need information regarding your level of benefits or out-of-pocket expenses, please contact your insurance company directly.  They can also confirm for you whether or not we (the surgeon and the hospital/surgery center) are in your plan’s preferred network (aka ‘in-network’).    What to do if… My Insurance Changes:  If, at any time, your insurance company, plan or even card changes… Please call our office so that our team can be sure to update your records.  We will need to make sure to submit any PreAuth or rudolph to the correct, up-to-date insurance plan.      What to do if… My Insurance Requires A Referral:  If your insurance company requires a Referral for Specialty Care or to see a Specialist, you will need to confirm with them if you have one on file.    If your insurance carrier does not have a referral, then you will need to contact your Primary Care Physician to have one directly submitted to your insurance company ASAP.    Without a referral on file, your insurance company will not Pre-Authorize your surgery and may not cover any of your care with our specialty.    What to do if… I have a Workers Compensation (W/C) Claim:  If you have a W/C claim, please be sure to provide our reception team with the information you have regarding your claim ASAP.  We will contact your W/C carrier/adjustor to inform them of your upcoming surgery and check the status of your claim (open vs closed).  We will let you know if they advise of any concerns or issues with your claim.  Even if you have an open W/C claim, please also provide us with your personal/family insurance.  We will want to be sure this plan is loaded into your account.  We always PreAuthorize with personal insurance  as a back-up to W/C.  Otherwise, if W/C doesn’t cover something along the way, you will receive a bill for the services.    What to do if… I have Month-to-Month Coverage/Premiums:  If you have an insurance plan that is paid for month to month, or is subject to plan change on a monthly basis, please be aware we cannot initiate PreAuthorization until just before the month of your surgery, as your insurance company will need to verify your premium payments/eligibility first.    What to do if… I Do Not Have Insurance Coverage or Have other Insurance/Billing Questions:  Please call our Patient Contact Center:  [phone] to speak with a team member about your billing needs, including possible coverage options, setting up payment plans,our fee schedule, etc.                       negative...